# Patient Record
Sex: FEMALE | Race: WHITE | NOT HISPANIC OR LATINO | Employment: FULL TIME | URBAN - METROPOLITAN AREA
[De-identification: names, ages, dates, MRNs, and addresses within clinical notes are randomized per-mention and may not be internally consistent; named-entity substitution may affect disease eponyms.]

---

## 2017-02-09 ENCOUNTER — OFFICE VISIT (OUTPATIENT)
Dept: HEMATOLOGY/ONCOLOGY | Facility: CLINIC | Age: 52
End: 2017-02-09
Payer: COMMERCIAL

## 2017-02-09 ENCOUNTER — LAB VISIT (OUTPATIENT)
Dept: LAB | Facility: HOSPITAL | Age: 52
End: 2017-02-09
Attending: INTERNAL MEDICINE
Payer: COMMERCIAL

## 2017-02-09 VITALS
HEIGHT: 64 IN | BODY MASS INDEX: 25.67 KG/M2 | TEMPERATURE: 98 F | SYSTOLIC BLOOD PRESSURE: 120 MMHG | OXYGEN SATURATION: 100 % | HEART RATE: 91 BPM | WEIGHT: 150.38 LBS | DIASTOLIC BLOOD PRESSURE: 82 MMHG | RESPIRATION RATE: 18 BRPM

## 2017-02-09 DIAGNOSIS — Z85.3 HISTORY OF RIGHT BREAST CANCER: Primary | ICD-10-CM

## 2017-02-09 DIAGNOSIS — Z85.3 HISTORY OF RIGHT BREAST CANCER: ICD-10-CM

## 2017-02-09 LAB
ALBUMIN SERPL BCP-MCNC: 4.4 G/DL
ALP SERPL-CCNC: 73 U/L
ALT SERPL W/O P-5'-P-CCNC: 15 U/L
ANION GAP SERPL CALC-SCNC: 14 MMOL/L
AST SERPL-CCNC: 22 U/L
BASOPHILS # BLD AUTO: 0.03 K/UL
BASOPHILS NFR BLD: 0.7 %
BILIRUB SERPL-MCNC: 0.7 MG/DL
BUN SERPL-MCNC: 12 MG/DL
CALCIUM SERPL-MCNC: 10.3 MG/DL
CHLORIDE SERPL-SCNC: 103 MMOL/L
CHOLEST/HDLC SERPL: 3.1 {RATIO}
CO2 SERPL-SCNC: 24 MMOL/L
CREAT SERPL-MCNC: 0.8 MG/DL
DIFFERENTIAL METHOD: ABNORMAL
EOSINOPHIL # BLD AUTO: 0.2 K/UL
EOSINOPHIL NFR BLD: 3.8 %
ERYTHROCYTE [DISTWIDTH] IN BLOOD BY AUTOMATED COUNT: 12 %
EST. GFR  (AFRICAN AMERICAN): >60 ML/MIN/1.73 M^2
EST. GFR  (NON AFRICAN AMERICAN): >60 ML/MIN/1.73 M^2
GLUCOSE SERPL-MCNC: 91 MG/DL
HCT VFR BLD AUTO: 40.6 %
HDL/CHOLESTEROL RATIO: 31.8 %
HDLC SERPL-MCNC: 179 MG/DL
HDLC SERPL-MCNC: 57 MG/DL
HGB BLD-MCNC: 13.8 G/DL
LDLC SERPL CALC-MCNC: 110.4 MG/DL
LYMPHOCYTES # BLD AUTO: 2 K/UL
LYMPHOCYTES NFR BLD: 45.9 %
MCH RBC QN AUTO: 31.6 PG
MCHC RBC AUTO-ENTMCNC: 34 %
MCV RBC AUTO: 93 FL
MONOCYTES # BLD AUTO: 0.2 K/UL
MONOCYTES NFR BLD: 5.2 %
NEUTROPHILS # BLD AUTO: 2 K/UL
NEUTROPHILS NFR BLD: 44.4 %
NONHDLC SERPL-MCNC: 122 MG/DL
PLATELET # BLD AUTO: 196 K/UL
PMV BLD AUTO: 11.5 FL
POTASSIUM SERPL-SCNC: 3.7 MMOL/L
PROT SERPL-MCNC: 7.5 G/DL
RBC # BLD AUTO: 4.37 M/UL
SODIUM SERPL-SCNC: 141 MMOL/L
TRIGL SERPL-MCNC: 58 MG/DL
WBC # BLD AUTO: 4.44 K/UL

## 2017-02-09 PROCEDURE — 99999 PR PBB SHADOW E&M-EST. PATIENT-LVL III: CPT | Mod: PBBFAC,,, | Performed by: INTERNAL MEDICINE

## 2017-02-09 PROCEDURE — 80061 LIPID PANEL: CPT

## 2017-02-09 PROCEDURE — 36415 COLL VENOUS BLD VENIPUNCTURE: CPT | Mod: PO

## 2017-02-09 PROCEDURE — 85025 COMPLETE CBC W/AUTO DIFF WBC: CPT | Mod: PO

## 2017-02-09 PROCEDURE — 99214 OFFICE O/P EST MOD 30 MIN: CPT | Mod: S$GLB,,, | Performed by: INTERNAL MEDICINE

## 2017-02-09 PROCEDURE — 80053 COMPREHEN METABOLIC PANEL: CPT | Mod: PO

## 2017-02-09 RX ORDER — PHENTERMINE HYDROCHLORIDE 37.5 MG/1
37.5 TABLET ORAL DAILY
COMMUNITY
End: 2017-08-11
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
## 2017-02-10 ENCOUNTER — TELEPHONE (OUTPATIENT)
Dept: HEMATOLOGY/ONCOLOGY | Facility: CLINIC | Age: 52
End: 2017-02-10

## 2017-02-10 NOTE — TELEPHONE ENCOUNTER
----- Message from Ancelmo Batres MD sent at 2/10/2017 10:48 AM CST -----  Please let her know her lab tests including her cholesterol levels came out OK

## 2017-08-10 ENCOUNTER — TELEPHONE (OUTPATIENT)
Dept: HEMATOLOGY/ONCOLOGY | Facility: CLINIC | Age: 52
End: 2017-08-10

## 2017-08-10 DIAGNOSIS — Z85.3 HISTORY OF RIGHT BREAST CANCER: Primary | ICD-10-CM

## 2017-08-10 NOTE — TELEPHONE ENCOUNTER
----- Message from Zofia Vickers sent at 8/10/2017 10:37 AM CDT -----  Contact: [phone]   Pt would like consult with the nurse about appt.  Please call back at [phone].  Thx-[facility]

## 2017-08-11 ENCOUNTER — LAB VISIT (OUTPATIENT)
Dept: LAB | Facility: HOSPITAL | Age: 52
End: 2017-08-11
Attending: INTERNAL MEDICINE
Payer: COMMERCIAL

## 2017-08-11 ENCOUNTER — OFFICE VISIT (OUTPATIENT)
Dept: HEMATOLOGY/ONCOLOGY | Facility: CLINIC | Age: 52
End: 2017-08-11
Payer: COMMERCIAL

## 2017-08-11 VITALS
HEART RATE: 64 BPM | SYSTOLIC BLOOD PRESSURE: 123 MMHG | WEIGHT: 164.69 LBS | TEMPERATURE: 98 F | BODY MASS INDEX: 28.12 KG/M2 | RESPIRATION RATE: 16 BRPM | DIASTOLIC BLOOD PRESSURE: 85 MMHG | OXYGEN SATURATION: 99 % | HEIGHT: 64 IN

## 2017-08-11 DIAGNOSIS — Z85.3 HISTORY OF RIGHT BREAST CANCER: Primary | ICD-10-CM

## 2017-08-11 DIAGNOSIS — Z85.3 HISTORY OF RIGHT BREAST CANCER: ICD-10-CM

## 2017-08-11 LAB
ALBUMIN SERPL BCP-MCNC: 4 G/DL
ALP SERPL-CCNC: 74 U/L
ALT SERPL W/O P-5'-P-CCNC: 19 U/L
ANION GAP SERPL CALC-SCNC: 10 MMOL/L
AST SERPL-CCNC: 27 U/L
BASOPHILS # BLD AUTO: 0.02 K/UL
BASOPHILS NFR BLD: 0.6 %
BILIRUB SERPL-MCNC: 0.5 MG/DL
BUN SERPL-MCNC: 10 MG/DL
CALCIUM SERPL-MCNC: 9.7 MG/DL
CHLORIDE SERPL-SCNC: 105 MMOL/L
CO2 SERPL-SCNC: 27 MMOL/L
CREAT SERPL-MCNC: 0.8 MG/DL
DIFFERENTIAL METHOD: ABNORMAL
EOSINOPHIL # BLD AUTO: 0.3 K/UL
EOSINOPHIL NFR BLD: 7.2 %
ERYTHROCYTE [DISTWIDTH] IN BLOOD BY AUTOMATED COUNT: 12.4 %
EST. GFR  (AFRICAN AMERICAN): >60 ML/MIN/1.73 M^2
EST. GFR  (NON AFRICAN AMERICAN): >60 ML/MIN/1.73 M^2
GLUCOSE SERPL-MCNC: 89 MG/DL
HCT VFR BLD AUTO: 38.2 %
HGB BLD-MCNC: 13 G/DL
LYMPHOCYTES # BLD AUTO: 1.7 K/UL
LYMPHOCYTES NFR BLD: 47.9 %
MCH RBC QN AUTO: 31.6 PG
MCHC RBC AUTO-ENTMCNC: 34 G/DL
MCV RBC AUTO: 93 FL
MONOCYTES # BLD AUTO: 0.2 K/UL
MONOCYTES NFR BLD: 4.5 %
NEUTROPHILS # BLD AUTO: 1.4 K/UL
NEUTROPHILS NFR BLD: 39.8 %
PLATELET # BLD AUTO: 197 K/UL
PMV BLD AUTO: 11 FL
POTASSIUM SERPL-SCNC: 3.9 MMOL/L
PROT SERPL-MCNC: 7.1 G/DL
RBC # BLD AUTO: 4.11 M/UL
SODIUM SERPL-SCNC: 142 MMOL/L
WBC # BLD AUTO: 3.59 K/UL

## 2017-08-11 PROCEDURE — 99999 PR PBB SHADOW E&M-EST. PATIENT-LVL III: CPT | Mod: PBBFAC,,, | Performed by: INTERNAL MEDICINE

## 2017-08-11 PROCEDURE — 36415 COLL VENOUS BLD VENIPUNCTURE: CPT | Mod: PO

## 2017-08-11 PROCEDURE — 3008F BODY MASS INDEX DOCD: CPT | Mod: S$GLB,,, | Performed by: INTERNAL MEDICINE

## 2017-08-11 PROCEDURE — 85025 COMPLETE CBC W/AUTO DIFF WBC: CPT | Mod: PO

## 2017-08-11 PROCEDURE — 80053 COMPREHEN METABOLIC PANEL: CPT | Mod: PO

## 2017-08-11 PROCEDURE — 99214 OFFICE O/P EST MOD 30 MIN: CPT | Mod: S$GLB,,, | Performed by: INTERNAL MEDICINE

## 2017-08-14 ENCOUNTER — TELEPHONE (OUTPATIENT)
Dept: HEMATOLOGY/ONCOLOGY | Facility: CLINIC | Age: 52
End: 2017-08-14

## 2017-08-14 ENCOUNTER — PATIENT MESSAGE (OUTPATIENT)
Dept: HEMATOLOGY/ONCOLOGY | Facility: CLINIC | Age: 52
End: 2017-08-14

## 2017-08-14 DIAGNOSIS — Z85.3 HISTORY OF BREAST CANCER: Primary | ICD-10-CM

## 2017-08-14 NOTE — TELEPHONE ENCOUNTER
----- Message from Ancelmo Batres MD sent at 8/14/2017  3:36 PM CDT -----  Please add a lipid profile to her tests when she comes back in 6 months  DR BATRES

## 2017-10-12 ENCOUNTER — OFFICE VISIT (OUTPATIENT)
Dept: HEMATOLOGY/ONCOLOGY | Facility: CLINIC | Age: 52
End: 2017-10-12
Payer: COMMERCIAL

## 2017-10-12 VITALS
TEMPERATURE: 99 F | RESPIRATION RATE: 18 BRPM | WEIGHT: 158.94 LBS | DIASTOLIC BLOOD PRESSURE: 84 MMHG | BODY MASS INDEX: 27.13 KG/M2 | HEART RATE: 72 BPM | SYSTOLIC BLOOD PRESSURE: 120 MMHG | OXYGEN SATURATION: 96 % | HEIGHT: 64 IN

## 2017-10-12 DIAGNOSIS — Z85.3 HISTORY OF RIGHT BREAST CANCER: Primary | ICD-10-CM

## 2017-10-12 PROCEDURE — 99999 PR PBB SHADOW E&M-EST. PATIENT-LVL III: CPT | Mod: PBBFAC,,, | Performed by: INTERNAL MEDICINE

## 2017-10-12 PROCEDURE — 99213 OFFICE O/P EST LOW 20 MIN: CPT | Mod: S$GLB,,, | Performed by: INTERNAL MEDICINE

## 2017-10-12 NOTE — PROGRESS NOTES
Subjective:       Patient ID: Kaelyn Rosales is a 52 y.o. female.    Chief Complaint: Follow-up    HPI This is a 50-year-old  lady who comes for follow up pf her previously diagnosed rbeast cancer    She saw   me for a second opinion in 04/2009. The patient informed me that she had   been having nipple pain on the right breast for several months. Because of  the persistence of the pain, she was evaluated and found to have an   abnormality in the breast on repeat mammograms. One done four months prior  to that had been negative. She had a biopsy that showed invasive   carcinoma. She elected to have surgery in [place] and had decided to   have a bilateral mastectomy. She had a modified radical mastectomy with   right and a simple mastectomy on the left on 03/03/2009. The pathology   indicated that she had a tubular adenocarcinoma of the right breast with   negative lymph node. It measured 0.6 cm. It was ER/CO positive.   HER-2/Karla was 1+. This corresponds to sample [id number] from the   [facility] [place], [place], done on 03/03/2009.   The patient had a cholecystectomy in December of 2007. Some liver nodules   were seen on preop CT, but at the time of the surgery the nodules looked   benign and they were not biopsied. CT scans done in [place], [place], in   01/27/2009, where she had gone on opinion before deciding on surgery had   been reported as showing increase in the size of the liver nodules when   compared to a CT of 12/2007.     Imaging studies done at our institution were finally interpreted by the   radiologist as showing cysts in the liver as opposed to solid masses. She   was asked to consider taking tamoxifen in the adjuvant setting. She took   it for a few weeks but started feeling very poorly. She has been followed   off any medications.   She comes in ahead of her scheduled appointmetn because she felt what feels like a knot in the right axilla  SOCIAL HISTORY: She  is  with one child. She lives in [place].  She quit smoking around . She smoked 1 to 1-1/2 packs a day for  10 years. No history of significant drinking. 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     FAMILY HISTORY: No diabetes or heart attacks. Uncle had cancer of the   prostate. Father had colon cancer at 79.     PREVIOUS SURGERIES: Bilateral mastectomy followed by reconstruction.   Myomectomy of the uterus in place. Cholecystectomy,  and   laparoscopy.   Right thumb surgery    PAST MEDICAL HISTORY:   1. History of breast cancer in the right s/p mastectomy and s/p simple prophylactic mastectomy on the left.  2. Status post bilateral reconstruction of breast.   3. Arthralgias.   4. Hot flashes.   5- fatty liver  6-Pancreatitis  7-diverticulosis by colonoscopy  Review of Systems   Constitutional: Negative.  Negative for appetite change and unexpected weight change.   HENT: Negative.    Eyes: Negative.  Negative for visual disturbance.   Respiratory: Negative.  Negative for cough, shortness of breath and wheezing.    Cardiovascular: Negative.  Negative for chest pain.   Gastrointestinal: Negative.  Negative for abdominal pain and diarrhea.   Genitourinary: Negative.  Negative for frequency.   Musculoskeletal: Negative for back pain.   Skin: Negative for rash.   Neurological: Positive for headaches.   Hematological: Negative for adenopathy.   Psychiatric/Behavioral: Negative.  The patient is not nervous/anxious.        Objective:      Physical Exam   HENT:   Head: Normocephalic.   Nose: Nose normal.   Eyes: Conjunctivae are normal. Pupils are equal, round, and reactive to light.   Neck: Normal range of motion.   Cardiovascular: Normal rate.    Pulmonary/Chest: Effort normal.   Reconstructed right breast feels normal.  Careful palpation of the right axilla fails to show any obvious abnormalities   Musculoskeletal: Normal range of motion.   Neurological: She is alert.    Psychiatric: She has a normal mood and affect. Her behavior is normal. Judgment and thought content normal.       Wt Readings from Last 3 Encounters:   10/12/17 72.1 kg (158 lb 15.2 oz)   08/11/17 74.7 kg (164 lb 10.9 oz)   02/09/17 68.2 kg (150 lb 5.7 oz)     Temp Readings from Last 3 Encounters:   10/12/17 98.6 °F (37 °C) (Oral)   08/11/17 97.9 °F (36.6 °C) (Oral)   02/09/17 98.3 °F (36.8 °C) (Oral)     BP Readings from Last 3 Encounters:   10/12/17 120/84   08/11/17 123/85   02/09/17 120/82     Pulse Readings from Last 3 Encounters:   10/12/17 72   08/11/17 64   02/09/17 91       Assessment:       1. History of right breast cancer      2-question of palpable abnormality in right axillae    Plan:     Pateint informed that I was not able to feel any abnormalities in the right axilla. She was asked to keep an eye on this and let me know if the symptoms do not improve or worsen, otherwise keep future  appointments as previously scheduled.

## 2018-03-15 ENCOUNTER — TELEPHONE (OUTPATIENT)
Dept: HEMATOLOGY/ONCOLOGY | Facility: CLINIC | Age: 53
End: 2018-03-15

## 2018-03-15 DIAGNOSIS — Z85.3 HISTORY OF RIGHT BREAST CANCER: Primary | ICD-10-CM

## 2018-03-15 NOTE — TELEPHONE ENCOUNTER
----- Message from Samreen Murray sent at 3/15/2018  9:35 AM CDT -----  Contact: pt  Pt is requesting a call back from nurse in regards to her test scheduled for mar 16, 2018 pt stated that she would like to add thyroid and cholesterol to her blood work.        [phone] (home)

## 2018-04-20 ENCOUNTER — OFFICE VISIT (OUTPATIENT)
Dept: HEMATOLOGY/ONCOLOGY | Facility: CLINIC | Age: 53
End: 2018-04-20
Payer: COMMERCIAL

## 2018-04-20 ENCOUNTER — LAB VISIT (OUTPATIENT)
Dept: LAB | Facility: HOSPITAL | Age: 53
End: 2018-04-20
Attending: INTERNAL MEDICINE
Payer: COMMERCIAL

## 2018-04-20 VITALS
OXYGEN SATURATION: 98 % | HEART RATE: 60 BPM | RESPIRATION RATE: 18 BRPM | TEMPERATURE: 98 F | BODY MASS INDEX: 27.1 KG/M2 | SYSTOLIC BLOOD PRESSURE: 104 MMHG | HEIGHT: 64 IN | WEIGHT: 158.75 LBS | DIASTOLIC BLOOD PRESSURE: 80 MMHG

## 2018-04-20 DIAGNOSIS — Z85.3 HISTORY OF RIGHT BREAST CANCER: Primary | ICD-10-CM

## 2018-04-20 DIAGNOSIS — Z85.3 HISTORY OF RIGHT BREAST CANCER: ICD-10-CM

## 2018-04-20 LAB
ALBUMIN SERPL BCP-MCNC: 3.9 G/DL
ALP SERPL-CCNC: 84 U/L
ALT SERPL W/O P-5'-P-CCNC: 18 U/L
ANION GAP SERPL CALC-SCNC: 8 MMOL/L
AST SERPL-CCNC: 28 U/L
BASOPHILS # BLD AUTO: 0.02 K/UL
BASOPHILS NFR BLD: 0.5 %
BILIRUB SERPL-MCNC: 0.6 MG/DL
BUN SERPL-MCNC: 11 MG/DL
CALCIUM SERPL-MCNC: 9.5 MG/DL
CHLORIDE SERPL-SCNC: 104 MMOL/L
CHOLEST SERPL-MCNC: 151 MG/DL
CHOLEST/HDLC SERPL: 3.4 {RATIO}
CO2 SERPL-SCNC: 28 MMOL/L
CREAT SERPL-MCNC: 0.7 MG/DL
DIFFERENTIAL METHOD: ABNORMAL
EOSINOPHIL # BLD AUTO: 0.2 K/UL
EOSINOPHIL NFR BLD: 4.3 %
ERYTHROCYTE [DISTWIDTH] IN BLOOD BY AUTOMATED COUNT: 12.7 %
EST. GFR  (AFRICAN AMERICAN): >60 ML/MIN/1.73 M^2
EST. GFR  (NON AFRICAN AMERICAN): >60 ML/MIN/1.73 M^2
GLUCOSE SERPL-MCNC: 90 MG/DL
HCT VFR BLD AUTO: 38.2 %
HDLC SERPL-MCNC: 44 MG/DL
HDLC SERPL: 29.1 %
HGB BLD-MCNC: 12.8 G/DL
LDLC SERPL CALC-MCNC: 96.8 MG/DL
LYMPHOCYTES # BLD AUTO: 1.9 K/UL
LYMPHOCYTES NFR BLD: 44.9 %
MCH RBC QN AUTO: 31.3 PG
MCHC RBC AUTO-ENTMCNC: 33.5 G/DL
MCV RBC AUTO: 93 FL
MONOCYTES # BLD AUTO: 0.3 K/UL
MONOCYTES NFR BLD: 6.7 %
NEUTROPHILS # BLD AUTO: 1.8 K/UL
NEUTROPHILS NFR BLD: 43.6 %
NONHDLC SERPL-MCNC: 107 MG/DL
PLATELET # BLD AUTO: 166 K/UL
PMV BLD AUTO: 12.4 FL
POTASSIUM SERPL-SCNC: 3.9 MMOL/L
PROT SERPL-MCNC: 6.8 G/DL
RBC # BLD AUTO: 4.09 M/UL
SODIUM SERPL-SCNC: 140 MMOL/L
T4 SERPL-MCNC: 6.4 UG/DL
TRIGL SERPL-MCNC: 51 MG/DL
TSH SERPL DL<=0.005 MIU/L-ACNC: 1.35 UIU/ML
WBC # BLD AUTO: 4.21 K/UL

## 2018-04-20 PROCEDURE — 80061 LIPID PANEL: CPT | Mod: PO

## 2018-04-20 PROCEDURE — 80053 COMPREHEN METABOLIC PANEL: CPT | Mod: PO

## 2018-04-20 PROCEDURE — 36415 COLL VENOUS BLD VENIPUNCTURE: CPT | Mod: PO

## 2018-04-20 PROCEDURE — 84436 ASSAY OF TOTAL THYROXINE: CPT

## 2018-04-20 PROCEDURE — 99214 OFFICE O/P EST MOD 30 MIN: CPT | Mod: S$GLB,,, | Performed by: INTERNAL MEDICINE

## 2018-04-20 PROCEDURE — 84443 ASSAY THYROID STIM HORMONE: CPT

## 2018-04-20 PROCEDURE — 85025 COMPLETE CBC W/AUTO DIFF WBC: CPT | Mod: PO

## 2018-04-20 PROCEDURE — 99999 PR PBB SHADOW E&M-EST. PATIENT-LVL III: CPT | Mod: PBBFAC,,, | Performed by: INTERNAL MEDICINE

## 2018-04-20 NOTE — PROGRESS NOTES
Subjective:       Patient ID: Kaelyn Rosales is a 52 y.o. female.    Chief Complaint: Follow-up    HPI  This is a 52 year-old  lady who comes for follow up pf her previously diagnosed rbeast cancer    She saw   me for a second opinion in 04/2009. The patient informed me that she had   been having nipple pain on the right breast for several months. Because of  the persistence of the pain, she was evaluated and found to have an   abnormality in the breast on repeat mammograms. One done four months prior  to that had been negative. She had a biopsy that showed invasive   carcinoma. She elected to have surgery in [place] and had decided to   have a bilateral mastectomy. She had a modified radical mastectomy with   right and a simple mastectomy on the left on 03/03/2009. The pathology   indicated that she had a tubular adenocarcinoma of the right breast with   negative lymph node. It measured 0.6 cm. It was ER/DE positive.   HER-2/Karla was 1+. This corresponds to sample [id number] from the   [facility] [place], [place], done on 03/03/2009.   The patient had a cholecystectomy in December of 2007. Some liver nodules   were seen on preop CT, but at the time of the surgery the nodules looked   benign and they were not biopsied. CT scans done in [place], [place], in   01/27/2009, where she had gone on opinion before deciding on surgery had   been reported as showing increase in the size of the liver nodules when   compared to a CT of 12/2007.     Imaging studies done at our institution were finally interpreted by the   radiologist as showing cysts in the liver as opposed to solid masses. She   was asked to consider taking tamoxifen in the adjuvant setting. She took   it for a few weeks but started feeling very poorly. She has been followed   off any medications.   She says shehas recurrent bouts of pancreatitis which is being addressed by hr gastroenterologist..  She says that is believed  that the pancreatitis are due to alcohol which she would take while  on vacation and weekeends. Says she will not drink any more.    She does not have any more mammograms since she had both breast removed with subsequent implants  SOCIAL HISTORY: She is  with one child. She lives in [place].  She quit smoking around . She smoked 1 to 1-1/2 packs a day for  10 years.   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     FAMILY HISTORY: No diabetes or heart attacks. Uncle had cancer of the   prostate. Father had colon cancer at 79.     PREVIOUS SURGERIES: Bilateral mastectomy followed by reconstruction.   Myomectomy of the uterus in place. Cholecystectomy,  and   laparoscopy.   Right thumb surgery    PAST MEDICAL HISTORY:   1. History of breast cancer in the right s/p mastectomy and s/p simple prophylactic mastectomy on the left.  2. Status post bilateral reconstruction of breast.   3. Arthralgias.   4. Hot flashes.   5- fatty liver  6-Pancreatitis  7-diverticulosis by colonoscopy  Review of Systems   Constitutional: Negative.    HENT: Negative.    Eyes: Negative.    Respiratory: Negative.  Negative for cough and wheezing.    Cardiovascular: Negative.  Negative for chest pain.   Gastrointestinal: Negative.    Genitourinary: Negative.    Neurological: Negative.    Psychiatric/Behavioral: Negative.        Objective:      Physical Exam   Constitutional: She is oriented to person, place, and time. She appears well-developed. No distress.   HENT:   Head: Normocephalic.   Right Ear: Tympanic membrane, external ear and ear canal normal.   Left Ear: Tympanic membrane, external ear and ear canal normal.   Nose: Nose normal. Right sinus exhibits no maxillary sinus tenderness and no frontal sinus tenderness. Left sinus exhibits no maxillary sinus tenderness and no frontal sinus tenderness.   Mouth/Throat: Oropharynx is clear and moist and mucous membranes are normal.   Teeth normal.  Gums  normal.   Eyes: Conjunctivae and lids are normal. Pupils are equal, round, and reactive to light.   Neck: Normal carotid pulses, no hepatojugular reflux and no JVD present. Carotid bruit is not present. No tracheal deviation present. No thyroid mass and no thyromegaly present.   Cardiovascular: Normal rate, regular rhythm, S1 normal, S2 normal, normal heart sounds and intact distal pulses.  Exam reveals no gallop and no friction rub.    No murmur heard.  Carotid exam normal   Pulmonary/Chest: Effort normal and breath sounds normal. No accessory muscle usage. No respiratory distress. She has no wheezes. She has no rales. She exhibits no tenderness.   Reconstructed breasts fell normal without mases   Abdominal: Soft. Normal appearance. She exhibits no distension and no mass. There is no splenomegaly or hepatomegaly. There is no tenderness. There is no rebound and no guarding.   Musculoskeletal: Normal range of motion. She exhibits no edema or tenderness.        Right hand: Normal.        Left hand: Normal.       Lymphadenopathy:     She has no cervical adenopathy.     She has no axillary adenopathy.        Right: No inguinal and no supraclavicular adenopathy present.        Left: No inguinal and no supraclavicular adenopathy present.   Neurological: She is alert and oriented to person, place, and time. She has normal strength. No cranial nerve deficit. Coordination normal.   Skin: Skin is warm and dry. No rash noted. She is not diaphoretic. No cyanosis or erythema. No pallor. Nails show no clubbing.   Psychiatric: She has a normal mood and affect. Her behavior is normal. Judgment and thought content normal.       Wt Readings from Last 3 Encounters:   04/20/18 72 kg (158 lb 11.7 oz)   10/12/17 72.1 kg (158 lb 15.2 oz)   08/11/17 74.7 kg (164 lb 10.9 oz)     Temp Readings from Last 3 Encounters:   04/20/18 98 °F (36.7 °C) (Oral)   10/12/17 98.6 °F (37 °C) (Oral)   08/11/17 97.9 °F (36.6 °C) (Oral)     BP Readings from  Last 3 Encounters:   04/20/18 104/80   10/12/17 120/84   08/11/17 123/85     Pulse Readings from Last 3 Encounters:   04/20/18 60   10/12/17 72   08/11/17 64       Assessment:       1. History of right breast cancer    2. Recurrent pancreatitis        Plan:       CBC and cmp are pending  She will be asked to see me in a year with a cbc and a cmp.  Follow up with GI outside the clinic regarding her recurrent bouts of pancreatitis

## 2018-05-21 ENCOUNTER — OFFICE VISIT (OUTPATIENT)
Dept: CARDIOLOGY | Facility: CLINIC | Age: 53
End: 2018-05-21
Payer: COMMERCIAL

## 2018-05-21 ENCOUNTER — HOSPITAL ENCOUNTER (OUTPATIENT)
Dept: RADIOLOGY | Facility: HOSPITAL | Age: 53
Discharge: HOME | End: 2018-05-21
Attending: INTERNAL MEDICINE
Payer: COMMERCIAL

## 2018-05-21 ENCOUNTER — OFFICE VISIT (OUTPATIENT)
Dept: HEMATOLOGY/ONCOLOGY | Facility: CLINIC | Age: 53
End: 2018-05-21
Payer: COMMERCIAL

## 2018-05-21 ENCOUNTER — TELEPHONE (OUTPATIENT)
Dept: HEMATOLOGY/ONCOLOGY | Facility: CLINIC | Age: 53
End: 2018-05-21

## 2018-05-21 VITALS
BODY MASS INDEX: 26.18 KG/M2 | HEIGHT: 64 IN | DIASTOLIC BLOOD PRESSURE: 98 MMHG | TEMPERATURE: 98 F | WEIGHT: 153.38 LBS | SYSTOLIC BLOOD PRESSURE: 130 MMHG | OXYGEN SATURATION: 100 % | HEART RATE: 65 BPM

## 2018-05-21 VITALS
BODY MASS INDEX: 25.95 KG/M2 | DIASTOLIC BLOOD PRESSURE: 70 MMHG | SYSTOLIC BLOOD PRESSURE: 110 MMHG | HEIGHT: 64 IN | WEIGHT: 152 LBS | HEART RATE: 80 BPM

## 2018-05-21 DIAGNOSIS — Z85.3 HISTORY OF RIGHT BREAST CANCER: ICD-10-CM

## 2018-05-21 DIAGNOSIS — R06.02 SHORTNESS OF BREATH: ICD-10-CM

## 2018-05-21 DIAGNOSIS — Z85.3 HISTORY OF RIGHT BREAST CANCER: Primary | ICD-10-CM

## 2018-05-21 DIAGNOSIS — R94.31 ABNORMAL EKG: ICD-10-CM

## 2018-05-21 DIAGNOSIS — R93.89 ABNORMAL CXR: ICD-10-CM

## 2018-05-21 DIAGNOSIS — R07.89 ATYPICAL CHEST PAIN: ICD-10-CM

## 2018-05-21 DIAGNOSIS — R94.31 ABNORMAL EKG: Primary | ICD-10-CM

## 2018-05-21 PROCEDURE — 71046 X-RAY EXAM CHEST 2 VIEWS: CPT | Mod: TC,FY,PO

## 2018-05-21 PROCEDURE — 99999 PR PBB SHADOW E&M-EST. PATIENT-LVL III: CPT | Mod: PBBFAC,,, | Performed by: INTERNAL MEDICINE

## 2018-05-21 PROCEDURE — 3008F BODY MASS INDEX DOCD: CPT | Mod: CPTII,S$GLB,, | Performed by: INTERNAL MEDICINE

## 2018-05-21 PROCEDURE — 71046 X-RAY EXAM CHEST 2 VIEWS: CPT | Mod: 26,,, | Performed by: RADIOLOGY

## 2018-05-21 PROCEDURE — 99214 OFFICE O/P EST MOD 30 MIN: CPT | Mod: S$GLB,,, | Performed by: INTERNAL MEDICINE

## 2018-05-21 PROCEDURE — 99204 OFFICE O/P NEW MOD 45 MIN: CPT | Mod: S$GLB,,, | Performed by: INTERNAL MEDICINE

## 2018-05-21 RX ORDER — LORAZEPAM 0.5 MG/1
0.5 TABLET ORAL EVERY 6 HOURS PRN
COMMUNITY
End: 2021-01-15 | Stop reason: SDUPTHER

## 2018-05-21 RX ORDER — PREDNISONE 50 MG/1
TABLET ORAL
Qty: 3 TABLET | Refills: 0 | Status: SHIPPED | OUTPATIENT
Start: 2018-05-21 | End: 2018-05-22

## 2018-05-21 NOTE — TELEPHONE ENCOUNTER
Patient states that she went to urgent care at [facility] yesterday.  They told her that she had something a mass in her lung that is causing her shortness of breath.  I advised her to come in now.

## 2018-05-21 NOTE — PROGRESS NOTES
Subjective:    Patient ID:  Kaelyn Rosales is a 52 y.o. female who presents for evaluation of Establish Care; Follow-up; Chest Pain; and Shortness of Breath    Pt referred by Dr. Tj Laguerre.      HPI pt presents for cardiac evaluation.  Pt has h/o breast cancer and h/o multiple hospitalizations for pancreatitis.  Former smoker.    No etoh last few months.   Pt seen in [facility] walk in clinic yesterday for dyspnea.  ecg yesterday showed NSR, intraventricular conduction delay, low voltage, nonspecific t wave abnl.   Under a lot of stress, anxiety with her work situation.  Has a bad boss per pt who antagonizes her.   She states she has chronic panic attacks.  Seen in ER for 2 week sxs of HAYDEN, lightheadedness, dizziness, headaches.  Some chest pressure, mid chest, not constant, over last few weeks.  CP atypical, lasts variable duration.   + fatigue.    No specific aggravating or alleviating factors.   States she hasn't had panic attacks in years.    Also had CXR yesterday, some sort of small right upper lobe density noted.  She had repeat CXR today at [facility] and was -.  Heme/onc ordered CT chest tomorrow.   She denies prior h/o CAD, CHF.       Current Outpatient Prescriptions:     LORazepam (ATIVAN) 0.5 MG tablet, Take 0.5 mg by mouth every 6 (six) hours as needed for Anxiety., Disp: , Rfl:     MULTIVITAMIN (MULTIPLE VITAMIN ORAL), Take by mouth once daily., Disp: , Rfl:     predniSONE (DELTASONE) 50 MG Tab, One tablet 13 hours before procedure, one tablet 7 hours before procedure and one tablet one hour before procedure, Disp: 3 tablet, Rfl: 0      Review of Systems   Constitution: Positive for malaise/fatigue.   HENT: Negative.    Eyes: Negative.    Cardiovascular: Positive for chest pain and dyspnea on exertion.   Respiratory: Positive for shortness of breath.    Endocrine: Negative.    Hematologic/Lymphatic: Negative.    Skin: Negative.    Musculoskeletal: Positive for arthritis.   Gastrointestinal:  "Negative.    Genitourinary: Negative.    Neurological: Positive for dizziness, headaches and light-headedness.   Psychiatric/Behavioral: Negative.    Allergic/Immunologic: Negative.        /70 (BP Location: Left arm, Patient Position: Sitting, BP Method: Large (Manual))   Pulse 80   Ht 5' 4" (1.626 m)   Wt 68.9 kg (152 lb)   BMI 26.09 kg/m²     Wt Readings from Last 3 Encounters:   05/21/18 68.9 kg (152 lb)   04/20/18 72 kg (158 lb 11.7 oz)   10/12/17 72.1 kg (158 lb 15.2 oz)     Temp Readings from Last 3 Encounters:   04/20/18 98 °F (36.7 °C) (Oral)   10/12/17 98.6 °F (37 °C) (Oral)   08/11/17 97.9 °F (36.6 °C) (Oral)     BP Readings from Last 3 Encounters:   05/21/18 110/70   04/20/18 104/80   10/12/17 120/84     Pulse Readings from Last 3 Encounters:   05/21/18 80   04/20/18 60   10/12/17 72          Objective:    Physical Exam   Constitutional: She is oriented to person, place, and time. Vital signs are normal. She appears well-developed and well-nourished. She is active and cooperative. She does not have a sickly appearance. She does not appear ill. No distress.   HENT:   Head: Normocephalic.   Neck: Neck supple. Normal carotid pulses, no hepatojugular reflux and no JVD present. Carotid bruit is not present. No thyromegaly present.   Cardiovascular: Normal rate, regular rhythm, S1 normal, S2 normal, normal heart sounds and normal pulses.  PMI is not displaced.  Exam reveals no gallop and no friction rub.    No murmur heard.  Pulses:       Radial pulses are 2+ on the right side, and 2+ on the left side.   Pulmonary/Chest: Effort normal and breath sounds normal. She has no wheezes. She has no rales.   Abdominal: Soft. Normal appearance, normal aorta and bowel sounds are normal. She exhibits no pulsatile liver, no abdominal bruit, no ascites and no mass. There is no splenomegaly or hepatomegaly. There is no tenderness.   Musculoskeletal: She exhibits no edema.   Lymphadenopathy:     She has no cervical " adenopathy.   Neurological: She is alert and oriented to person, place, and time.   Skin: Skin is warm. She is not diaphoretic.   Psychiatric: She has a normal mood and affect. Her behavior is normal.   Nursing note and vitals reviewed.      I have reviewed all pertinent labs and cardiac studies.      Chemistry        Component Value Date/Time     04/20/2018 1024    K 3.9 04/20/2018 1024     04/20/2018 1024    CO2 28 04/20/2018 1024    BUN 11 04/20/2018 1024    CREATININE 0.7 04/20/2018 1024    GLU 90 04/20/2018 1024        Component Value Date/Time    CALCIUM 9.5 04/20/2018 1024    ALKPHOS 84 04/20/2018 1024    AST 28 04/20/2018 1024    ALT 18 04/20/2018 1024    BILITOT 0.6 04/20/2018 1024    ESTGFRAFRICA >60 04/20/2018 1024    EGFRNONAA >60 04/20/2018 1024        Lab Results   Component Value Date    WBC 4.21 04/20/2018    HGB 12.8 04/20/2018    HCT 38.2 04/20/2018    MCV 93 04/20/2018     04/20/2018     No results found for: LABA1C, HGBA1C  Lab Results   Component Value Date    CHOL 151 04/20/2018    CHOL 179 02/09/2017    CHOL 164 01/11/2012     Lab Results   Component Value Date    HDL 44 04/20/2018    HDL 57 02/09/2017    HDL 67 01/11/2012     Lab Results   Component Value Date    LDLCALC 96.8 04/20/2018    LDLCALC 110.4 02/09/2017    LDLCALC 87.0 01/11/2012     Lab Results   Component Value Date    TRIG 51 04/20/2018    TRIG 58 02/09/2017    TRIG 50 01/11/2012     Lab Results   Component Value Date    CHOLHDL 29.1 04/20/2018    CHOLHDL 31.8 02/09/2017    CHOLHDL 40.9 01/11/2012           Assessment:       1. Abnormal EKG    2. Shortness of breath    3. Atypical chest pain         Plan:             Atypical cp/HAYDEN sxs last 2 weeks.  sxs may be due to stress/anxiety issues.  F/u with heme/onc on abnl CXR and CT chest tomorrow.  Recommend stress MPI + echocardiogram for further evaluation.  10 year ASCVD risk 1.2%  Phone review for test results.

## 2018-05-21 NOTE — TELEPHONE ENCOUNTER
----- Message from Saravanan Quan sent at 5/21/2018  7:14 AM CDT -----  Contact: Pt   States she's calling rg having some test results so that Dr's office will know what's going on and what to schedule for her and evaluate if pt needs to be hospitalized as well and can be reached at [phone]//rosalia/dbw

## 2018-05-21 NOTE — PROGRESS NOTES
Subjective:       Patient ID: Kaelyn Rosales is a 52 y.o. female.    Chief Complaint: Chest Pain    HPI This is a 52 year-old  lady who comes for follow up pf her previously diagnosed rbeast cancer    She saw   me for a second opinion in 04/2009. The patient informed me that she had   been having nipple pain on the right breast for several months. Because of  the persistence of the pain, she was evaluated and found to have an   abnormality in the breast on repeat mammograms. One done four months prior  to that had been negative. She had a biopsy that showed invasive   carcinoma. She elected to have surgery in [place] and had decided to   have a bilateral mastectomy. She had a modified radical mastectomy with   right and a simple mastectomy on the left on 03/03/2009. The pathology   indicated that she had a tubular adenocarcinoma of the right breast with   negative lymph node. It measured 0.6 cm. It was ER/MD positive.   HER-2/Karla was 1+. This corresponds to sample [id number] from the   [facility] [place], [place], done on 03/03/2009.   The patient had a cholecystectomy in December of 2007. Some liver nodules   were seen on preop CT, but at the time of the surgery the nodules looked   benign and they were not biopsied. CT scans done in [place], [place], in   01/27/2009, where she had gone on opinion before deciding on surgery had   been reported as showing increase in the size of the liver nodules when   compared to a CT of 12/2007.     Imaging studies done at our institution were finally interpreted by the   radiologist as showing cysts in the liver as opposed to solid masses. She   was asked to consider taking tamoxifen in the adjuvant setting. She took   it for a few weeks but started feeling very poorly. She has been followed   off any medications.   She says shehas recurrent bouts of pancreatitis which is being addressed by hr gastroenterologist..  She says that is believed  that the pancreatitis are due to alcohol which she would take while  on vacation and weekeends. Says she will not drink any more.     She does not have any more mammograms since she had both breast removed with subsequent implants    She comes tgday ahead of her scheduled appointment because for the last several days she has felt SOB along with a pressure sensation in the chest.  She went to an Urgent Care clinic last night. Her EKG was abnormal, showing some ST-T inversion in  the analy lateral leads.  No previous EKGs were there for comparison. In addition, a CXR was read as showing a probable stellate lesion in the right upper lobe, below the first rib  SOCIAL HISTORY: She is  with one child. She lives in [place].    She quit smoking around . She smoked 1 to 1-1/2 packs a day for  10 years.   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     FAMILY HISTORY: No diabetes or heart attacks. Uncle had cancer of the   prostate. Father had colon cancer at 79.     PREVIOUS SURGERIES: Bilateral mastectomy followed by reconstruction.   Myomectomy of the uterus in place. Cholecystectomy,  and   laparoscopy.   Right thumb surgery    PAST MEDICAL HISTORY:   1. History of breast cancer in the right s/p mastectomy and s/p simple prophylactic mastectomy on the left.  2. Status post bilateral reconstruction of breast.   3. Arthralgias.   4. Hot flashes.   5- fatty liver  6-Pancreatitis  7-diverticulosis by colonoscopy  Review of Systems   Constitutional: Negative.    HENT: Negative.    Eyes: Negative.    Respiratory: Positive for shortness of breath. Negative for cough and wheezing.    Cardiovascular:        Chest pressure   Gastrointestinal: Negative.    Genitourinary: Negative.    Neurological: Negative.    Psychiatric/Behavioral: Negative.        Objective:      Physical Exam   Constitutional: She is oriented to person, place, and time. She appears well-developed. No distress.    HENT:   Head: Normocephalic.   Right Ear: Tympanic membrane, external ear and ear canal normal.   Left Ear: Tympanic membrane, external ear and ear canal normal.   Nose: Nose normal. Right sinus exhibits no maxillary sinus tenderness and no frontal sinus tenderness. Left sinus exhibits no maxillary sinus tenderness and no frontal sinus tenderness.   Mouth/Throat: Oropharynx is clear and moist and mucous membranes are normal.   Teeth normal.  Gums normal.   Eyes: Conjunctivae and lids are normal. Pupils are equal, round, and reactive to light.   Neck: Normal carotid pulses, no hepatojugular reflux and no JVD present. Carotid bruit is not present. No tracheal deviation present. No thyroid mass and no thyromegaly present.   Cardiovascular: Normal rate, regular rhythm, S1 normal, S2 normal, normal heart sounds and intact distal pulses.  Exam reveals no gallop and no friction rub.    No murmur heard.  Carotid exam normal   Pulmonary/Chest: Effort normal and breath sounds normal. No accessory muscle usage. No respiratory distress. She has no wheezes. She has no rales. She exhibits no tenderness.   Abdominal: Soft. Normal appearance. She exhibits no distension and no mass. There is no splenomegaly or hepatomegaly. There is no tenderness. There is no rebound and no guarding.   Musculoskeletal: Normal range of motion. She exhibits no edema or tenderness.        Right hand: Normal.        Left hand: Normal.       Lymphadenopathy:     She has no cervical adenopathy.     She has no axillary adenopathy.        Right: No inguinal and no supraclavicular adenopathy present.        Left: No inguinal and no supraclavicular adenopathy present.   Neurological: She is alert and oriented to person, place, and time. She has normal strength. No cranial nerve deficit. Coordination normal.   Skin: Skin is warm and dry. No rash noted. She is not diaphoretic. No cyanosis or erythema. No pallor. Nails show no clubbing.   Psychiatric: She has  a normal mood and affect. Her behavior is normal. Judgment and thought content normal.       Wt Readings from Last 3 Encounters:   05/21/18 69.6 kg (153 lb 6.4 oz)   04/20/18 72 kg (158 lb 11.7 oz)   10/12/17 72.1 kg (158 lb 15.2 oz)     Temp Readings from Last 3 Encounters:   05/21/18 98.1 °F (36.7 °C) (Oral)   04/20/18 98 °F (36.7 °C) (Oral)   10/12/17 98.6 °F (37 °C) (Oral)     BP Readings from Last 3 Encounters:   05/21/18 (!) 130/98   04/20/18 104/80   10/12/17 120/84     Pulse Readings from Last 3 Encounters:   05/21/18 65   04/20/18 60   10/12/17 72       Assessment:       1. History of right breast cancer    2. Abnormal CXR    3. Abnormal EKG      4-SOB  Plan:        a new CXR was read and was personally reviewed and discussed with radiology. The interpretation at our Clinic was that the X ray was normal.  Because of there discrepancy , we will request a CT of the chest.  In addition, the EKG shows some T  wave inversion   in the analy-lateral leads so she will meet with Cardiology today.  See me after the CT of the chest. premedications ordered because of presumed Iodine allergy. Benadryl/prednisone prescribeed

## 2018-05-22 ENCOUNTER — HOSPITAL ENCOUNTER (OUTPATIENT)
Dept: RADIOLOGY | Facility: HOSPITAL | Age: 53
Discharge: HOME | End: 2018-05-22
Attending: INTERNAL MEDICINE
Payer: COMMERCIAL

## 2018-05-22 ENCOUNTER — OFFICE VISIT (OUTPATIENT)
Dept: HEMATOLOGY/ONCOLOGY | Facility: CLINIC | Age: 53
End: 2018-05-22
Payer: COMMERCIAL

## 2018-05-22 VITALS
HEART RATE: 88 BPM | BODY MASS INDEX: 26.15 KG/M2 | TEMPERATURE: 98 F | OXYGEN SATURATION: 98 % | DIASTOLIC BLOOD PRESSURE: 72 MMHG | SYSTOLIC BLOOD PRESSURE: 102 MMHG | WEIGHT: 153.19 LBS | HEIGHT: 64 IN

## 2018-05-22 DIAGNOSIS — R06.02 SHORTNESS OF BREATH: ICD-10-CM

## 2018-05-22 DIAGNOSIS — K76.89 HEPATIC CYST: ICD-10-CM

## 2018-05-22 DIAGNOSIS — Z85.3 HISTORY OF RIGHT BREAST CANCER: Primary | ICD-10-CM

## 2018-05-22 PROCEDURE — 25500020 PHARM REV CODE 255: Mod: PO | Performed by: INTERNAL MEDICINE

## 2018-05-22 PROCEDURE — 99999 PR PBB SHADOW E&M-EST. PATIENT-LVL III: CPT | Mod: PBBFAC,,, | Performed by: INTERNAL MEDICINE

## 2018-05-22 PROCEDURE — 99213 OFFICE O/P EST LOW 20 MIN: CPT | Mod: S$GLB,,, | Performed by: INTERNAL MEDICINE

## 2018-05-22 PROCEDURE — 3008F BODY MASS INDEX DOCD: CPT | Mod: CPTII,S$GLB,, | Performed by: INTERNAL MEDICINE

## 2018-05-22 PROCEDURE — 71260 CT THORAX DX C+: CPT | Mod: TC,PO

## 2018-05-22 PROCEDURE — 71260 CT THORAX DX C+: CPT | Mod: 26,,, | Performed by: RADIOLOGY

## 2018-05-22 RX ORDER — EPINEPHRINE 0.3 MG/.3ML
INJECTION SUBCUTANEOUS
COMMUNITY

## 2018-05-22 RX ORDER — DIAZEPAM 2 MG/1
2 TABLET ORAL EVERY 6 HOURS PRN
COMMUNITY
End: 2023-05-26

## 2018-05-22 RX ADMIN — IOHEXOL 75 ML: 350 INJECTION, SOLUTION INTRAVENOUS at 10:05

## 2018-05-22 NOTE — PROGRESS NOTES
Subjective:       Patient ID: Kaelyn Rosales is a 52 y.o. female.    Chief Complaint: Follow-up    HPI This is a 52 year-old  lady who comes for follow up pf her previously diagnosed rbeast cancer    She saw   me for a second opinion in 04/2009. The patient informed me that she had   been having nipple pain on the right breast for several months. Because of  the persistence of the pain, she was evaluated and found to have an   abnormality in the breast on repeat mammograms. One done four months prior  to that had been negative. She had a biopsy that showed invasive   carcinoma. She elected to have surgery in [place] and had decided to   have a bilateral mastectomy. She had a modified radical mastectomy with   right and a simple mastectomy on the left on 03/03/2009. The pathology   indicated that she had a tubular adenocarcinoma of the right breast with   negative lymph node. It measured 0.6 cm. It was ER/VA positive.   HER-2/Karla was 1+. This corresponds to sample [id number] from the   [facility] [place], [place], done on 03/03/2009.   The patient had a cholecystectomy in December of 2007. Some liver nodules   were seen on preop CT, but at the time of the surgery the nodules looked   benign and they were not biopsied. CT scans done in [place], [place], in   01/27/2009, where she had gone on opinion before deciding on surgery had   been reported as showing increase in the size of the liver nodules when   compared to a CT of 12/2007.     Imaging studies done at our institution were finally interpreted by the   radiologist as showing cysts in the liver as opposed to solid masses. She   was asked to consider taking tamoxifen in the adjuvant setting. She took   it for a few weeks but started feeling very poorly. She has been followed   off any medications.   She says shehas recurrent bouts of pancreatitis which is being addressed by hr gastroenterologist..  She says that is believed  that the pancreatitis are due to alcohol which she would take while  on vacation and weekeends. Says she will not drink any more.     She does not have any more mammograms since she had both breast removed with subsequent implants     She comes tgday ahead of her scheduled appointment because for the last several days she has felt SOB along with a pressure sensation in the chest.  She went to an Urgent Care clinic 2  Nights ago . Her EKG was abnormal,and she was asked to follow up with cardiology> she was seen yesterday by cardiology. For the time being a conservative approach was suggested.   In addition, a CXR was read as showing a probable stellate lesion in the right upper lobe, below the first rib.  A CXR here was read as negative. I ordered a CT of the chest  No nodules were reported. She ahs her known hepatic cysts some of which are worse, along with pulmonary blebs  She comes to discus what to do going forward  SOCIAL HISTORY: She is  with one child. She lives in [place].    She quit smoking around . She smoked 1 to 1-1/2 packs a day for  10 years.   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     FAMILY HISTORY: No diabetes or heart attacks. Uncle had cancer of the   prostate. Father had colon cancer at 79.     PREVIOUS SURGERIES: Bilateral mastectomy followed by reconstruction.   Myomectomy of the uterus in place. Cholecystectomy,  and   laparoscopy.   Right thumb surgery    PAST MEDICAL HISTORY:   1. History of breast cancer in the right s/p mastectomy and s/p simple prophylactic mastectomy on the left.  2. Status post bilateral reconstruction of breast.   3. Arthralgias.   4. Hepatic cysts   5- fatty liver  6-Recurrent ancreatitis  7-diverticulosis by colonoscopy  Review of Systems   Constitutional: Negative.    HENT: Negative.    Eyes: Negative.    Respiratory: Negative.  Negative for cough and wheezing.    Cardiovascular: Negative.  Negative for chest pain.    Gastrointestinal: Negative.    Genitourinary: Negative.    Neurological: Negative.    Psychiatric/Behavioral: Negative.        Objective:      Physical Exam   Constitutional: She is oriented to person, place, and time. She appears well-developed. No distress.   HENT:   Head: Normocephalic.   Right Ear: Tympanic membrane and ear canal normal.   Left Ear: Tympanic membrane and ear canal normal.   Nose: Right sinus exhibits no maxillary sinus tenderness and no frontal sinus tenderness. Left sinus exhibits no maxillary sinus tenderness and no frontal sinus tenderness.   Mouth/Throat: Mucous membranes are normal.   Teeth normal.  Gums normal.   Eyes: Lids are normal. Pupils are equal, round, and reactive to light.   Neck: Normal carotid pulses, no hepatojugular reflux and no JVD present. Carotid bruit is not present. No tracheal deviation present. No thyroid mass and no thyromegaly present.   Cardiovascular: Normal rate, S1 normal and S2 normal.  Exam reveals no gallop and no friction rub.    No murmur heard.  Carotid exam normal   Pulmonary/Chest: Effort normal. No accessory muscle usage. No respiratory distress. She has no wheezes. She has no rales. She exhibits no tenderness.   Abdominal: Normal appearance. She exhibits no distension and no mass. There is no splenomegaly or hepatomegaly. There is no tenderness. There is no rebound and no guarding.   Musculoskeletal: Normal range of motion. She exhibits no edema or tenderness.        Right hand: Normal.        Left hand: Normal.       Lymphadenopathy:     She has no cervical adenopathy.     She has no axillary adenopathy.        Right: No inguinal and no supraclavicular adenopathy present.        Left: No inguinal and no supraclavicular adenopathy present.   Neurological: She is alert and oriented to person, place, and time. She has normal strength. No cranial nerve deficit. Coordination normal.   Skin: Skin is warm and dry. No rash noted. She is not diaphoretic. No  cyanosis or erythema. No pallor. Nails show no clubbing.   Psychiatric: She has a normal mood and affect. Her behavior is normal. Judgment and thought content normal.       Wt Readings from Last 3 Encounters:   05/22/18 69.5 kg (153 lb 3.2 oz)   05/21/18 69.6 kg (153 lb 6.4 oz)   05/21/18 68.9 kg (152 lb)     Temp Readings from Last 3 Encounters:   05/22/18 98.1 °F (36.7 °C) (Oral)   05/21/18 98.1 °F (36.7 °C) (Oral)   04/20/18 98 °F (36.7 °C) (Oral)     BP Readings from Last 3 Encounters:   05/22/18 102/72   05/21/18 (!) 130/98   05/21/18 110/70     Pulse Readings from Last 3 Encounters:   05/22/18 88   05/21/18 65   05/21/18 80       Assessment:       1. History of right breast cancer    2. Hepatic cyst    3. Shortness of breath        Plan:       Patient came in accompanied by her . They were given a copy of the report. She was told about the pulmonary blebs. Since she complained of SOB, we will ask her to follow up with Pulmonary.  Keep appointments with em as scheduled previously

## 2018-05-28 ENCOUNTER — CLINICAL SUPPORT (OUTPATIENT)
Dept: CARDIOLOGY | Facility: CLINIC | Age: 53
End: 2018-05-28
Attending: INTERNAL MEDICINE
Payer: COMMERCIAL

## 2018-05-28 ENCOUNTER — HOSPITAL ENCOUNTER (OUTPATIENT)
Dept: RADIOLOGY | Facility: HOSPITAL | Age: 53
Discharge: HOME | End: 2018-05-28
Attending: INTERNAL MEDICINE
Payer: COMMERCIAL

## 2018-05-28 ENCOUNTER — TELEPHONE (OUTPATIENT)
Dept: CARDIOLOGY | Facility: CLINIC | Age: 53
End: 2018-05-28

## 2018-05-28 DIAGNOSIS — R94.31 ABNORMAL EKG: ICD-10-CM

## 2018-05-28 DIAGNOSIS — R07.89 ATYPICAL CHEST PAIN: ICD-10-CM

## 2018-05-28 DIAGNOSIS — R06.02 SHORTNESS OF BREATH: ICD-10-CM

## 2018-05-28 LAB
DIASTOLIC DYSFUNCTION: NO
DIASTOLIC DYSFUNCTION: NO
ESTIMATED PA SYSTOLIC PRESSURE: 26.81
RETIRED EF AND QEF - SEE NOTES: 55 (ref 55–65)

## 2018-05-28 PROCEDURE — 93015 CV STRESS TEST SUPVJ I&R: CPT | Mod: S$GLB,,, | Performed by: NUCLEAR MEDICINE

## 2018-05-28 PROCEDURE — 93306 TTE W/DOPPLER COMPLETE: CPT | Mod: S$GLB,,, | Performed by: NUCLEAR MEDICINE

## 2018-05-28 PROCEDURE — A9502 TC99M TETROFOSMIN: HCPCS | Mod: PO

## 2018-05-28 PROCEDURE — 78452 HT MUSCLE IMAGE SPECT MULT: CPT | Mod: 26,,, | Performed by: NUCLEAR MEDICINE

## 2018-05-28 NOTE — TELEPHONE ENCOUNTER
Please call pt  She passed her nuclear stress test.  No blockages noted.  Echo shows normal heart strength/function.    F/u prn    Dr Pond

## 2018-05-29 ENCOUNTER — OFFICE VISIT (OUTPATIENT)
Dept: PULMONOLOGY | Facility: CLINIC | Age: 53
End: 2018-05-29
Payer: COMMERCIAL

## 2018-05-29 ENCOUNTER — TELEPHONE (OUTPATIENT)
Dept: CARDIOLOGY | Facility: CLINIC | Age: 53
End: 2018-05-29

## 2018-05-29 ENCOUNTER — LAB VISIT (OUTPATIENT)
Dept: LAB | Facility: HOSPITAL | Age: 53
End: 2018-05-29
Attending: INTERNAL MEDICINE
Payer: COMMERCIAL

## 2018-05-29 VITALS
OXYGEN SATURATION: 97 % | DIASTOLIC BLOOD PRESSURE: 64 MMHG | BODY MASS INDEX: 26 KG/M2 | RESPIRATION RATE: 19 BRPM | WEIGHT: 152.31 LBS | HEART RATE: 69 BPM | SYSTOLIC BLOOD PRESSURE: 110 MMHG | HEIGHT: 64 IN

## 2018-05-29 DIAGNOSIS — R06.02 SOB (SHORTNESS OF BREATH): ICD-10-CM

## 2018-05-29 DIAGNOSIS — Z87.891 HX OF SMOKING: ICD-10-CM

## 2018-05-29 DIAGNOSIS — R06.02 SOB (SHORTNESS OF BREATH): Primary | ICD-10-CM

## 2018-05-29 DIAGNOSIS — J43.9 LUNG BLEBS: ICD-10-CM

## 2018-05-29 DIAGNOSIS — J43.9 LUNG BULLAE: ICD-10-CM

## 2018-05-29 LAB — D DIMER PPP IA.FEU-MCNC: 0.2 MG/L FEU

## 2018-05-29 PROCEDURE — 36415 COLL VENOUS BLD VENIPUNCTURE: CPT | Mod: PO

## 2018-05-29 PROCEDURE — 99204 OFFICE O/P NEW MOD 45 MIN: CPT | Mod: S$GLB,,, | Performed by: INTERNAL MEDICINE

## 2018-05-29 PROCEDURE — 99999 PR PBB SHADOW E&M-EST. PATIENT-LVL III: CPT | Mod: PBBFAC,,, | Performed by: INTERNAL MEDICINE

## 2018-05-29 PROCEDURE — 3008F BODY MASS INDEX DOCD: CPT | Mod: CPTII,S$GLB,, | Performed by: INTERNAL MEDICINE

## 2018-05-29 PROCEDURE — 85379 FIBRIN DEGRADATION QUANT: CPT

## 2018-05-29 RX ORDER — ALBUTEROL SULFATE 90 UG/1
2 AEROSOL, METERED RESPIRATORY (INHALATION) EVERY 6 HOURS PRN
Qty: 18 G | Refills: 5 | Status: SHIPPED | OUTPATIENT
Start: 2018-05-29 | End: 2023-05-26

## 2018-05-29 NOTE — TELEPHONE ENCOUNTER
I have attempted without success to contact this patient by phone left message for pt to call back.

## 2018-05-29 NOTE — TELEPHONE ENCOUNTER
----- Message from Jazmin Lopez sent at 5/29/2018  9:01 AM CDT -----  Pt at [phone]//states she is returning your call//please call again//thanks/vidal

## 2018-05-29 NOTE — PROGRESS NOTES
Initial Outpatient Pulmonary Evaluation       SUBJECTIVE:     History of Present Illness:  Patient is a 52 y.o. female presenting for evaluation of shortness of breath that happened 3 weeks ago.  Patient said that she did have shortness of breath overnight about 3 weeks ago, it was not associated with chest pain, denies coughing denies sputum production and denies wheezing.  She does have a twenty-seven pack year smoking history quit smoking in 2003.  She does state that she is having some stressful issues at work.  She does state that she worked at [employer] and she had exposure to asbestos for 3 days.  She has a history of breast cancer with bilateral mastectomy 8 years ago.  No chemotherapy or radiation received.      Chief Complaint   Patient presents with    Shortness of Breath       Review of patient's allergies indicates:   Allergen Reactions    Iodine and iodide containing products     Minocycline Swelling    Tetracyclines     Bactrim [sulfamethoxazole-trimethoprim] Rash     B/P drops, fainting       Current Outpatient Prescriptions   Medication Sig Dispense Refill    diazePAM (VALIUM) 2 MG tablet Take 2 mg by mouth every 6 (six) hours as needed.      LORazepam (ATIVAN) 0.5 MG tablet Take 0.5 mg by mouth every 6 (six) hours as needed for Anxiety.      MULTIVITAMIN (MULTIPLE VITAMIN ORAL) Take by mouth once daily.      albuterol 90 mcg/actuation inhaler Inhale 2 puffs into the lungs every 6 (six) hours as needed for Wheezing. Rescue 18 g 5    EPINEPHrine (EPIPEN 2-THOM) 0.3 mg/0.3 mL AtIn as needed.       No current facility-administered medications for this visit.        Past Medical History:   Diagnosis Date    Allergy     Arthritis     Blood transfusion     Breast cancer      Past Surgical History:   Procedure Laterality Date    BREAST BIOPSY      c-sections  2003    CHOLECYSTECTOMY      COLONOSCOPY      MASTECTOMY       Family History  "  Problem Relation Age of Onset    Colon cancer Father      Social History   Substance Use Topics    Smoking status: Former Smoker     Packs/day: 1.00     Years: 27.00     Quit date: 2003    Smokeless tobacco: Never Used    Alcohol use No        Review of Systems:  Review of Systems   Constitutional: Negative for chills, fatigue and fever.   HENT: Negative for nosebleeds and voice change.    Eyes: Negative for redness.   Respiratory: Positive for shortness of breath. Negative for cough and wheezing.    Cardiovascular: Negative for chest pain and leg swelling.   Gastrointestinal: Negative for abdominal pain.   Endocrine: Negative for polyphagia.   Genitourinary: Negative for hematuria.   Musculoskeletal: Negative for gait problem.   Skin: Negative for rash and wound.   Allergic/Immunologic: Negative for immunocompromised state.   Neurological: Negative for seizures, speech difficulty and numbness.   Hematological: Negative for adenopathy.        History of breath cancer status post bilateral mastectomy   Psychiatric/Behavioral: Negative for behavioral problems. The patient is nervous/anxious.        OBJECTIVE:     Vital Signs (Most Recent)  Pulse: 69 (05/29/18 0751)  Resp: 19 (05/29/18 0751)  BP: 110/64 (05/29/18 0751)  SpO2: 97 % (05/29/18 0751)  5' 4" (1.626 m)  69.1 kg (152 lb 5.4 oz)     Physical Exam:  Physical Exam   Constitutional: She is oriented to person, place, and time. She appears well-developed and well-nourished.   HENT:   Head: Atraumatic.   Eyes: EOM are normal.   Neck: Neck supple.   Cardiovascular: Normal rate and regular rhythm.    Pulmonary/Chest: Effort normal and breath sounds normal.   Abdominal: Soft. There is no tenderness.   Musculoskeletal: She exhibits no edema or deformity.   Neurological: She is alert and oriented to person, place, and time.   Skin: Skin is warm.   Psychiatric: She has a normal mood and affect.       Laboratory    Lab Results   Component Value Date    WBC 4.21 " 04/20/2018    HGB 12.8 04/20/2018    HCT 38.2 04/20/2018    MCV 93 04/20/2018     04/20/2018     BMP  Lab Results   Component Value Date     04/20/2018    K 3.9 04/20/2018     04/20/2018    CO2 28 04/20/2018    BUN 11 04/20/2018    CREATININE 0.7 04/20/2018    CALCIUM 9.5 04/20/2018    ANIONGAP 8 04/20/2018    ESTGFRAFRICA >60 04/20/2018    EGFRNONAA >60 04/20/2018     BNP  @LABRCNTIP(BNP,BNPTRIAGEBLO)@  Lab Results   Component Value Date    TSH 1.354 04/20/2018     ABG  @LABRCNTIP(PH,PO2,PCO2,HCO3,BE)@    Diagnostic Results:    CT chest personally reviewed.  Left upper lobe blebs, bulla noted.    Bibasilar areas of subsegmental atelectasis are noted.  No bronchiectasis.  No pleural effusion or pneumothorax.  There is a bullae present within the lateral aspect of the left upper lung which measures up to 3.2 cm.  Small blebs are seen in the peripheral right upper lung.  No pulmonary mass is identified.  Mild scarring at the lung apices.  ASSESSMENT/PLAN:     SOB (shortness of breath)  -     D-DIMER, QUANTITATIVE; Future; Expected date: 05/29/2018  -     albuterol 90 mcg/actuation inhaler; Inhale 2 puffs into the lungs every 6 (six) hours as needed for Wheezing. Rescue  Dispense: 18 g; Refill: 5    Lung blebs  -     Complete PFT with bronchodilator; Future    Lung bullae  -     Complete PFT with bronchodilator; Future    Hx of smoking  -     Complete PFT with bronchodilator; Future      We will treat the patient empirically with albuterol as needed for shortness of breath, check D-dimer.    PFT to assess patient lung function.    If the above workup is normal, patient's shortness of breath might most likely be stress related.    Follow-up in about 6 weeks (around 7/10/2018).    This note was prepared using voice recognition system and is likely to have sound alike errors that may have been overlooked even after proof reading.  Please call me with any questions    Discussed diagnosis, its evaluation,  treatment and usual course. All questions answered.    Thank you for the courtesy of participating in the care of this patient    Kurtis Park MD

## 2018-05-29 NOTE — LETTER
May 29, 2018      Ancelmo Batres MD  [address] [facility] [address] [facility]  [place] [address]           [facility] - [facility]  [address] [place] [address]  Phone: [phone]  Fax: [phone]          Patient: Kaelyn Rosales   MR Number: [id number]   YOB: 1965   Date of Visit: 5/29/2018       Dear Dr. Ancelmo Batres:    Thank you for referring Kaelyn Rosales to me for evaluation. Attached you will find relevant portions of my assessment and plan of care.    If you have questions, please do not hesitate to call me. I look forward to following Kaelyn Rosales along with you.    Sincerely,    Kurtis Park MD    Enclosure  CC:  No Recipients    If you would like to receive this communication electronically, please contact [email or web address] or [phone] to request more information on [employer] Link access.    For providers and/or their staff who would like to refer a patient to [facility], please contact us through our one-stop-shop provider referral line, [facility], at [phone].    If you feel you have received this communication in error or would no longer like to receive these types of communications, please e-mail [email or web address]

## 2018-06-29 ENCOUNTER — TELEPHONE (OUTPATIENT)
Dept: PULMONOLOGY | Facility: CLINIC | Age: 53
End: 2018-06-29

## 2020-01-13 ENCOUNTER — TELEPHONE (OUTPATIENT)
Dept: HEMATOLOGY/ONCOLOGY | Facility: CLINIC | Age: 55
End: 2020-01-13

## 2020-01-13 NOTE — TELEPHONE ENCOUNTER
Spoke to patient informed will sent message to provider to place orders for labs, verbalized understanding.

## 2020-01-13 NOTE — TELEPHONE ENCOUNTER
----- Message from Hillary Polk sent at 1/13/2020  8:55 AM CST -----  Contact: self [phone]  States that she need orders for labs for her appt scheduled on 01/21/20. Please call back at [phone]//thank you acc

## 2020-01-15 DIAGNOSIS — Z85.3 HISTORY OF RIGHT BREAST CANCER: Primary | ICD-10-CM

## 2020-01-17 ENCOUNTER — LAB VISIT (OUTPATIENT)
Dept: LAB | Facility: HOSPITAL | Age: 55
End: 2020-01-17
Attending: INTERNAL MEDICINE
Payer: COMMERCIAL

## 2020-01-17 DIAGNOSIS — Z85.3 HISTORY OF RIGHT BREAST CANCER: ICD-10-CM

## 2020-01-17 LAB
ALBUMIN SERPL BCP-MCNC: 4 G/DL (ref 3.5–5.2)
ALP SERPL-CCNC: 77 U/L (ref 55–135)
ALT SERPL W/O P-5'-P-CCNC: 25 U/L (ref 10–44)
ANION GAP SERPL CALC-SCNC: 10 MMOL/L (ref 8–16)
AST SERPL-CCNC: 29 U/L (ref 10–40)
BASOPHILS # BLD AUTO: 0.04 K/UL (ref 0–0.2)
BASOPHILS NFR BLD: 0.9 % (ref 0–1.9)
BILIRUB SERPL-MCNC: 0.6 MG/DL (ref 0.1–1)
BUN SERPL-MCNC: 12 MG/DL (ref 6–20)
CALCIUM SERPL-MCNC: 9.5 MG/DL (ref 8.7–10.5)
CHLORIDE SERPL-SCNC: 102 MMOL/L (ref 95–110)
CO2 SERPL-SCNC: 29 MMOL/L (ref 23–29)
CREAT SERPL-MCNC: 0.7 MG/DL (ref 0.5–1.4)
DIFFERENTIAL METHOD: ABNORMAL
EOSINOPHIL # BLD AUTO: 0.3 K/UL (ref 0–0.5)
EOSINOPHIL NFR BLD: 5.6 % (ref 0–8)
ERYTHROCYTE [DISTWIDTH] IN BLOOD BY AUTOMATED COUNT: 12.7 % (ref 11.5–14.5)
EST. GFR  (AFRICAN AMERICAN): >60 ML/MIN/1.73 M^2
EST. GFR  (NON AFRICAN AMERICAN): >60 ML/MIN/1.73 M^2
GLUCOSE SERPL-MCNC: 86 MG/DL (ref 70–110)
HCT VFR BLD AUTO: 37.2 % (ref 37–48.5)
HGB BLD-MCNC: 12.2 G/DL (ref 12–16)
IMM GRANULOCYTES # BLD AUTO: 0.01 K/UL (ref 0–0.04)
IMM GRANULOCYTES NFR BLD AUTO: 0.2 % (ref 0–0.5)
LYMPHOCYTES # BLD AUTO: 1.8 K/UL (ref 1–4.8)
LYMPHOCYTES NFR BLD: 40.4 % (ref 18–48)
MCH RBC QN AUTO: 30.9 PG (ref 27–31)
MCHC RBC AUTO-ENTMCNC: 32.8 G/DL (ref 32–36)
MCV RBC AUTO: 94 FL (ref 82–98)
MONOCYTES # BLD AUTO: 0.3 K/UL (ref 0.3–1)
MONOCYTES NFR BLD: 6.7 % (ref 4–15)
NEUTROPHILS # BLD AUTO: 2.1 K/UL (ref 1.8–7.7)
NEUTROPHILS NFR BLD: 46.4 % (ref 38–73)
NRBC BLD-RTO: 0 /100 WBC
PLATELET # BLD AUTO: 205 K/UL (ref 150–350)
PMV BLD AUTO: 11.2 FL (ref 9.2–12.9)
POTASSIUM SERPL-SCNC: 3.8 MMOL/L (ref 3.5–5.1)
PROT SERPL-MCNC: 7 G/DL (ref 6–8.4)
RBC # BLD AUTO: 3.95 M/UL (ref 4–5.4)
SODIUM SERPL-SCNC: 141 MMOL/L (ref 136–145)
WBC # BLD AUTO: 4.45 K/UL (ref 3.9–12.7)

## 2020-01-17 PROCEDURE — 80053 COMPREHEN METABOLIC PANEL: CPT

## 2020-01-17 PROCEDURE — 36415 COLL VENOUS BLD VENIPUNCTURE: CPT

## 2020-01-17 PROCEDURE — 85025 COMPLETE CBC W/AUTO DIFF WBC: CPT

## 2020-01-21 ENCOUNTER — OFFICE VISIT (OUTPATIENT)
Dept: HEMATOLOGY/ONCOLOGY | Facility: CLINIC | Age: 55
End: 2020-01-21
Payer: COMMERCIAL

## 2020-01-21 VITALS
WEIGHT: 163.56 LBS | DIASTOLIC BLOOD PRESSURE: 69 MMHG | HEIGHT: 64 IN | SYSTOLIC BLOOD PRESSURE: 118 MMHG | HEART RATE: 77 BPM | BODY MASS INDEX: 27.92 KG/M2 | OXYGEN SATURATION: 99 % | TEMPERATURE: 98 F

## 2020-01-21 DIAGNOSIS — Z85.3 HISTORY OF RIGHT BREAST CANCER: Primary | ICD-10-CM

## 2020-01-21 PROCEDURE — 99214 PR OFFICE/OUTPT VISIT, EST, LEVL IV, 30-39 MIN: ICD-10-PCS | Mod: S$GLB,,, | Performed by: INTERNAL MEDICINE

## 2020-01-21 PROCEDURE — 99214 OFFICE O/P EST MOD 30 MIN: CPT | Mod: S$GLB,,, | Performed by: INTERNAL MEDICINE

## 2020-01-21 PROCEDURE — 3008F BODY MASS INDEX DOCD: CPT | Mod: CPTII,S$GLB,, | Performed by: INTERNAL MEDICINE

## 2020-01-21 PROCEDURE — 99999 PR PBB SHADOW E&M-EST. PATIENT-LVL III: ICD-10-PCS | Mod: PBBFAC,,, | Performed by: INTERNAL MEDICINE

## 2020-01-21 PROCEDURE — 3008F PR BODY MASS INDEX (BMI) DOCUMENTED: ICD-10-PCS | Mod: CPTII,S$GLB,, | Performed by: INTERNAL MEDICINE

## 2020-01-21 PROCEDURE — 99999 PR PBB SHADOW E&M-EST. PATIENT-LVL III: CPT | Mod: PBBFAC,,, | Performed by: INTERNAL MEDICINE

## 2020-01-21 RX ORDER — TRAMADOL HYDROCHLORIDE 50 MG/1
50 TABLET ORAL EVERY 6 HOURS
COMMUNITY

## 2020-01-21 NOTE — PROGRESS NOTES
Subjective:       Patient ID: Kaelyn Rosales is a 54 y.o. female.    Chief Complaint: No chief complaint on file.    HPI This is a 54 year-old  lady who comes for follow up pf her previously diagnosed rbeast cancer    She saw   me for a second opinion in 04/2009. The patient informed me that she had   been having nipple pain on the right breast for several months. Because of  the persistence of the pain, she was evaluated and found to have an   abnormality in the breast on repeat mammograms. One done four months prior  to that had been negative. She had a biopsy that showed invasive   carcinoma. She elected to have surgery in [place] and had decided to   have a bilateral mastectomy. She had a modified radical mastectomy with   right and a simple mastectomy on the left on 03/03/2009. The pathology   indicated that she had a tubular adenocarcinoma of the right breast with   negative lymph node. It measured 0.6 cm. It was ER/OR positive.   HER-2/Karla was 1+. This corresponds to sample [id number] from the   [facility] [place], [place], done on 03/03/2009.   The patient had a cholecystectomy in December of 2007. Some liver nodules   were seen on preop CT, but at the time of the surgery the nodules looked   benign and they were not biopsied. CT scans done in [place], [place], in   01/27/2009, where she had gone on opinion before deciding on surgery had   been reported as showing increase in the size of the liver nodules when   compared to a CT of 12/2007.     Imaging studies done at our institution were finally interpreted by the   radiologist as showing cysts in the liver as opposed to solid masses. She   was asked to consider taking tamoxifen in the adjuvant setting. She took   it for a few weeks but started feeling very poorly. She has been followed   off any medications.   She says shehas recurrent bouts of pancreatitis which is being addressed by her gastroenterologist..last  attack was in 2018. She hs reduced her alcohol intake although she still has a drink very now and then.        She does not have any more mammograms since she had both breast removed with subsequent implants      SOCIAL HISTORY: She is  with one child. She lives in [place].    She quit smoking around . She smoked 1 to 1-1/2 packs a day for  10 years.   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     FAMILY HISTORY: No diabetes or heart attacks. Uncle had cancer of the   prostate. Father had colon cancer at 79.     PREVIOUS SURGERIES: Bilateral mastectomy followed by reconstruction.   Myomectomy of the uterus in place. Cholecystectomy,  and   laparoscopy.   Right thumb surgery    PAST MEDICAL HISTORY:   1. History of breast cancer in the right s/p mastectomy and s/p simple prophylactic mastectomy on the left.  2. Status post bilateral reconstruction of breast.   3. Arthralgias.   4. Hepatic cysts   5- fatty liver  6-Recurrent ancreatitis  7-diverticulosis by colonoscopy  Review of Systems   Constitutional: Negative.    HENT: Negative.    Eyes: Negative.    Respiratory: Negative.  Negative for cough and wheezing.    Cardiovascular: Negative.  Negative for chest pain.   Gastrointestinal: Negative.    Genitourinary: Negative.    Neurological: Negative.    Psychiatric/Behavioral: Negative.        Objective:      Physical Exam   Constitutional: She is oriented to person, place, and time. She appears well-developed. No distress.   HENT:   Head: Normocephalic.   Right Ear: Tympanic membrane, external ear and ear canal normal.   Left Ear: Tympanic membrane, external ear and ear canal normal.   Nose: Nose normal. Right sinus exhibits no maxillary sinus tenderness and no frontal sinus tenderness. Left sinus exhibits no maxillary sinus tenderness and no frontal sinus tenderness.   Mouth/Throat: Oropharynx is clear and moist and mucous membranes are normal.   Teeth normal.  Gums  normal.   Eyes: Pupils are equal, round, and reactive to light. Conjunctivae and lids are normal.   Neck: Normal carotid pulses, no hepatojugular reflux and no JVD present. Carotid bruit is not present. No tracheal deviation present. No thyroid mass and no thyromegaly present.   Cardiovascular: Normal rate, regular rhythm, S1 normal, S2 normal, normal heart sounds and intact distal pulses. Exam reveals no gallop and no friction rub.   No murmur heard.  Carotid exam normal   Pulmonary/Chest: Effort normal and breath sounds normal. No accessory muscle usage. No respiratory distress. She has no wheezes. She has no rales. She exhibits no tenderness.   Breast implants bilaterally. No  masses   Abdominal: Soft. Normal appearance. She exhibits no distension and no mass. There is no splenomegaly or hepatomegaly. There is no tenderness. There is no rebound and no guarding.   Musculoskeletal: Normal range of motion. She exhibits no edema or tenderness.        Right hand: Normal.        Left hand: Normal.   Nails normal   Lymphadenopathy:     She has no cervical adenopathy.   Neurological: She is alert and oriented to person, place, and time. No cranial nerve deficit. Coordination normal.   Skin: Skin is warm and dry. No rash noted. She is not diaphoretic. No erythema. No pallor.   Psychiatric: She has a normal mood and affect. Her behavior is normal. Judgment and thought content normal.       Wt Readings from Last 3 Encounters:   01/21/20 74.2 kg (163 lb 9.3 oz)   05/29/18 69.1 kg (152 lb 5.4 oz)   05/22/18 69.5 kg (153 lb 3.2 oz)     Temp Readings from Last 3 Encounters:   01/21/20 98 °F (36.7 °C) (Oral)   05/22/18 98.1 °F (36.7 °C) (Oral)   05/21/18 98.1 °F (36.7 °C) (Oral)     BP Readings from Last 3 Encounters:   01/21/20 118/69   05/29/18 110/64   05/22/18 102/72     Pulse Readings from Last 3 Encounters:   01/21/20 77   05/29/18 69   05/22/18 88       Assessment:       1. History of right breast cancer    2. Recurrent  pancreatitis        Plan:       Lab Results   Component Value Date    WBC 4.45 01/17/2020    HGB 12.2 01/17/2020    HCT 37.2 01/17/2020    MCV 94 01/17/2020     01/17/2020       Lab Results   Component Value Date    CREATININE 0.7 01/17/2020     Lab Results   Component Value Date    ALT 25 01/17/2020    AST 29 01/17/2020    ALKPHOS 77 01/17/2020    BILITOT 0.6 01/17/2020     Needs to stop alcohol completely.  She seems stable from the breast cancer point of view. Return in a year with a cbc/cmp

## 2020-02-15 ENCOUNTER — HOSPITAL ENCOUNTER (OUTPATIENT)
Dept: RADIOLOGY | Facility: CLINIC | Age: 55
Discharge: HOME | End: 2020-02-15
Attending: NURSE PRACTITIONER
Payer: COMMERCIAL

## 2020-02-15 ENCOUNTER — OFFICE VISIT (OUTPATIENT)
Dept: URGENT CARE | Facility: CLINIC | Age: 55
End: 2020-02-15
Payer: COMMERCIAL

## 2020-02-15 VITALS
SYSTOLIC BLOOD PRESSURE: 128 MMHG | OXYGEN SATURATION: 100 % | HEIGHT: 64 IN | WEIGHT: 163 LBS | DIASTOLIC BLOOD PRESSURE: 66 MMHG | BODY MASS INDEX: 27.83 KG/M2 | RESPIRATION RATE: 18 BRPM | TEMPERATURE: 98 F | HEART RATE: 66 BPM

## 2020-02-15 DIAGNOSIS — R07.81 RIB PAIN: ICD-10-CM

## 2020-02-15 DIAGNOSIS — R07.81 RIB PAIN: Primary | ICD-10-CM

## 2020-02-15 PROCEDURE — 71100 X-RAY EXAM RIBS UNI 2 VIEWS: CPT | Mod: RT,S$GLB,, | Performed by: RADIOLOGY

## 2020-02-15 PROCEDURE — 71100 XR RIBS 2 VIEW RIGHT: ICD-10-PCS | Mod: RT,S$GLB,, | Performed by: RADIOLOGY

## 2020-02-15 PROCEDURE — 99214 PR OFFICE/OUTPT VISIT, EST, LEVL IV, 30-39 MIN: ICD-10-PCS | Mod: S$GLB,,, | Performed by: NURSE PRACTITIONER

## 2020-02-15 PROCEDURE — 99214 OFFICE O/P EST MOD 30 MIN: CPT | Mod: S$GLB,,, | Performed by: NURSE PRACTITIONER

## 2020-02-15 NOTE — PATIENT INSTRUCTIONS
Treating Warts     You and your healthcare provider can discuss whether your warts need to be treated.     You and your healthcare provider can talk about what treatment may be best for your wart or warts. To get rid of your warts, your healthcare provider may need to try more than one type of treatment. The methods described below are often used to treat warts.  Types of treatment  · Do nothing. Most warts will resolve within 2 years, even without treatment. So doing nothing is sometimes a good option. This is particularly true for smaller warts that are not causing symptoms.  · Cryotherapy (liquid nitrogen). This kills skin cells by freezing them. It kills the warts and destroys skin infected by the wart-causing virus. This is done in your healthcare providers office and will cause some discomfort. It may take several treatments over several weeks to get rid of the warts.  · Topical medicines. Prescribed topical medicines can be put on the skin. These are usually applied in the healthcare provider's office. But some prescriptions may be applied at home.  · Over-the-counter (OTC) topical treatments. OTC medicines that most often contain salicylic acid may be an option. These patches, liquids, and creams are used at home. The medicine is applied daily to the wart and nearby skin. It's usually left on overnight. The dead skin is filed down the next day. In 1 to 3 days, the procedure can be repeated. Topical treatments are sometimes combined with cryotherapy.  · Electrodessication and curettage (ED & C).  For this procedure, the healthcare provider applies numbing medicine to the wart. Then the wart is scraped or cut off. This type of treatment is usually not the first line of therapy.  · Laser surgery.  This can vaporize wart tissue or destroy the blood vessels that feed the wart. This is done in the healthcare provider's office.  · Shots (injections). These can be used to treat warts that dont respond to other  treatments, such as stubborn or painful warts around the nails. This is done in the healthcare providers office.  When to seek medical treatment  Its a good idea to have your healthcare provider check your warts. That way your provider can rule out any other skin problems. Sometimes a callous or a corn can look like a wart, but the treatments may differ. Treatment can also provide relief from warts that bleed, burn, hurt, or itch. Genital warts should always be treated. They can spread to other people through sexual contact. And they may cause genital or cervical cancer.  Getting good results  After having your warts treated, new warts may still appear. Dont be discouraged. Warts often come back. See your healthcare provider again to discuss this. Your provider can tell you about the treatments that most likely will help clear your skin of warts.   Date Last Reviewed: 2/1/2017  © 8503-7950 [employer]. [address] [place] [address]. All rights reserved. This information is not intended as a substitute for professional medical care. Always follow your healthcare professional's instructions.        Rib Contusion     A rib contusion is a bruise to one or more rib bones. It may cause pain, tenderness, swelling and a purplish discoloration. There may be a sharp pain while breathing.  You will be assessed for other injuries. You will likely be given pain medicine. Rib contusions heal on their own, without further treatment. However, pain may take weeks to months to go away.   Note that a small crack (fracture) in the rib may cause the same symptoms as a rib contusion. The small crack may not be seen on a chest X-ray. However, the conditions are managed in the same way.  Home care  · Rest. Avoid heavy lifting, strenuous exertion, or any activity that causes pain.  · Ice the area to reduce pain and swelling. Put ice cubes in a plastic bag or use a cold pack. (Wrap the cold source in a thin  towel. Do not place it directly on your skin.) Ice the injured area for 20 minutes every 1 to 2 hours the first day. Continue with ice packs 3 to 4 times a day for the next 2 days, then as needed for the relief of pain and swelling.  · Take any prescribed pain medicine as directed by your healthcare provider. If none was prescribed, take acetaminophen, ibuprofen, or naproxen to control pain.  · If you have a significant injury, you may be given a device called an incentive spirometer to keep your lungs healthy. Use as directed.  Follow-up care  Follow up with your healthcare provider during the next week or as directed.  When to seek medical advice  Call your healthcare provider for any of the following:  · Shortness of breath or trouble breathing  · Increasing chest pain with breathing  · Coughing  · Dizziness, weakness, or fainting  · New or worsening pain  · Fever of 100.4°F (38ºC) or higher, or as directed by your healthcare provider  Date Last Reviewed: 2/1/2017  © 2752-6870 [employer]. [address] [place] [address], [place] [address]. All rights reserved. This information is not intended as a substitute for professional medical care. Always follow your healthcare professional's instructions.

## 2020-02-15 NOTE — PROGRESS NOTES
"Subjective:       Patient ID: Kaelyn Rosales is a 54 y.o. female.    Vitals:  height is 5' 4" (1.626 m) and weight is 73.9 kg (163 lb). Her temperature is 97.8 °F (36.6 °C). Her blood pressure is 128/66 and her pulse is 66. Her respiration is 18 and oxygen saturation is 100%.     Chief Complaint: Right Rib Pain    Pt presents to clinic with anterior lower rib pain. Pt was seen by her chiropractor four weeks ago, she believes when they adjusted her back they might have broken her rib. Pt states it hurts when she takes a deep breath and when she lifts up on something.    Chest Pain    This is a new problem. The current episode started 1 to 4 weeks ago. The pain is at a severity of 7/10. The quality of the pain is described as sharp. The pain does not radiate. Pertinent negatives include no cough, dizziness, fever, headaches, nausea, shortness of breath, vomiting or weakness. The pain is aggravated by deep breathing.       Constitution: Negative for chills, fatigue and fever.   HENT: Negative for congestion and sore throat.    Neck: Negative for painful lymph nodes.   Cardiovascular: Negative for chest pain and leg swelling.   Eyes: Negative for double vision and blurred vision.   Respiratory: Negative for cough and shortness of breath.    Gastrointestinal: Negative for nausea, vomiting and diarrhea.   Genitourinary: Negative for dysuria, frequency, urgency and history of kidney stones.   Musculoskeletal: Positive for pain and trauma. Negative for joint pain, joint swelling, muscle cramps and muscle ache.   Skin: Negative for color change, pale, rash and bruising.   Allergic/Immunologic: Negative for seasonal allergies.   Neurological: Negative for dizziness, history of vertigo, light-headedness, passing out and headaches.   Hematologic/Lymphatic: Negative for swollen lymph nodes.   Psychiatric/Behavioral: Negative for nervous/anxious, sleep disturbance and depression. The patient is not nervous/anxious.      "   Objective:      Physical Exam   Constitutional: She is oriented to person, place, and time. She appears well-developed and well-nourished. She is cooperative.  Non-toxic appearance. She does not appear ill. No distress.   HENT:   Head: Normocephalic and atraumatic.   Right Ear: Hearing, tympanic membrane, external ear and ear canal normal.   Left Ear: Hearing, tympanic membrane, external ear and ear canal normal.   Nose: Nose normal. No mucosal edema, rhinorrhea or nasal deformity. No epistaxis. Right sinus exhibits no maxillary sinus tenderness and no frontal sinus tenderness. Left sinus exhibits no maxillary sinus tenderness and no frontal sinus tenderness.   Mouth/Throat: Uvula is midline, oropharynx is clear and moist and mucous membranes are normal. No trismus in the jaw. Normal dentition. No uvula swelling. No posterior oropharyngeal erythema.   Eyes: Conjunctivae and lids are normal. Right eye exhibits no discharge. Left eye exhibits no discharge. No scleral icterus.   Neck: Trachea normal, normal range of motion, full passive range of motion without pain and phonation normal. Neck supple.   Cardiovascular: Normal rate, regular rhythm, normal heart sounds, intact distal pulses and normal pulses.   Pulmonary/Chest: Effort normal and breath sounds normal. No accessory muscle usage. No tachypnea and no bradypnea. No respiratory distress. She exhibits bony tenderness (right side under right breast rib region).   Abdominal: Soft. Normal appearance and bowel sounds are normal. She exhibits no distension, no pulsatile midline mass and no mass. There is no tenderness.   Musculoskeletal: Normal range of motion. She exhibits no edema or deformity.   Neurological: She is alert and oriented to person, place, and time. She exhibits normal muscle tone. Coordination normal.   Skin: Skin is warm, dry, intact, not diaphoretic and not pale.   Psychiatric: She has a normal mood and affect. Her speech is normal and behavior is  normal. Judgment and thought content normal. Cognition and memory are normal.   Nursing note and vitals reviewed.        Assessment:       1. Rib pain        Plan:         Rib pain  -     X-Ray Ribs 2 View Right; Future; Expected date: 02/15/2020          X-ray Ribs 2 View Right    Result Date: 2/15/2020  EXAMINATION: XR RIBS 2 VIEW RIGHT CLINICAL HISTORY: Pleurodynia TECHNIQUE: Two views of the right ribs were performed. COMPARISON: 05/21/2018 FINDINGS: Cardiac silhouette and mediastinum stable.  Lungs demonstrate no acute opacity.  No pleural effusion.  No definite rib abnormality appreciated.  Osseous structures unchanged.     As above Electronically signed by: Rex Turner MD Date:    02/15/2020 Time:    15:40    Follow prescribed treatment plan as directed.  Apply warm compresses as advised  OTC Tylenol or Motrin as advised  Stay hydrated and rest.  Report to ER if symptoms worsen.  Follow up with PCP in 2-3 days or sooner if symptoms do not improve.

## 2020-02-18 ENCOUNTER — TELEPHONE (OUTPATIENT)
Dept: URGENT CARE | Facility: CLINIC | Age: 55
End: 2020-02-18

## 2020-09-02 ENCOUNTER — OFFICE VISIT (OUTPATIENT)
Dept: UROLOGY | Facility: CLINIC | Age: 55
End: 2020-09-02
Payer: COMMERCIAL

## 2020-09-02 VITALS
SYSTOLIC BLOOD PRESSURE: 124 MMHG | BODY MASS INDEX: 29.4 KG/M2 | WEIGHT: 171.31 LBS | DIASTOLIC BLOOD PRESSURE: 78 MMHG | TEMPERATURE: 99 F

## 2020-09-02 DIAGNOSIS — N39.41 URGE INCONTINENCE: Primary | ICD-10-CM

## 2020-09-02 DIAGNOSIS — R31.29 MICROSCOPIC HEMATURIA: ICD-10-CM

## 2020-09-02 LAB
BILIRUB SERPL-MCNC: ABNORMAL MG/DL
BILIRUB UR QL STRIP: NEGATIVE
BLOOD URINE, POC: ABNORMAL
CLARITY UR: CLEAR
CLARITY, POC UA: CLEAR
COLOR UR: YELLOW
COLOR, POC UA: YELLOW
GLUCOSE UR QL STRIP: ABNORMAL
GLUCOSE UR QL STRIP: NEGATIVE
HGB UR QL STRIP: ABNORMAL
KETONES UR QL STRIP: ABNORMAL
KETONES UR QL STRIP: NEGATIVE
LEUKOCYTE ESTERASE UR QL STRIP: NEGATIVE
LEUKOCYTE ESTERASE URINE, POC: ABNORMAL
NITRITE UR QL STRIP: NEGATIVE
NITRITE, POC UA: ABNORMAL
PH UR STRIP: 6 [PH] (ref 5–8)
PH, POC UA: 5
PROT UR QL STRIP: NEGATIVE
PROTEIN, POC: ABNORMAL
SP GR UR STRIP: 1.02 (ref 1–1.03)
SPECIFIC GRAVITY, POC UA: 1.02
URN SPEC COLLECT METH UR: ABNORMAL
UROBILINOGEN, POC UA: ABNORMAL

## 2020-09-02 PROCEDURE — 99999 PR PBB SHADOW E&M-EST. PATIENT-LVL III: CPT | Mod: PBBFAC,,, | Performed by: UROLOGY

## 2020-09-02 PROCEDURE — 3008F PR BODY MASS INDEX (BMI) DOCUMENTED: ICD-10-PCS | Mod: CPTII,S$GLB,, | Performed by: UROLOGY

## 2020-09-02 PROCEDURE — 81002 POCT URINE DIPSTICK WITHOUT MICROSCOPE: ICD-10-PCS | Mod: S$GLB,,, | Performed by: UROLOGY

## 2020-09-02 PROCEDURE — 99203 PR OFFICE/OUTPT VISIT, NEW, LEVL III, 30-44 MIN: ICD-10-PCS | Mod: 25,S$GLB,, | Performed by: UROLOGY

## 2020-09-02 PROCEDURE — 3008F BODY MASS INDEX DOCD: CPT | Mod: CPTII,S$GLB,, | Performed by: UROLOGY

## 2020-09-02 PROCEDURE — 99203 OFFICE O/P NEW LOW 30 MIN: CPT | Mod: 25,S$GLB,, | Performed by: UROLOGY

## 2020-09-02 PROCEDURE — 81003 URINALYSIS AUTO W/O SCOPE: CPT

## 2020-09-02 PROCEDURE — 81002 URINALYSIS NONAUTO W/O SCOPE: CPT | Mod: S$GLB,,, | Performed by: UROLOGY

## 2020-09-02 PROCEDURE — 99999 PR PBB SHADOW E&M-EST. PATIENT-LVL III: ICD-10-PCS | Mod: PBBFAC,,, | Performed by: UROLOGY

## 2020-09-02 PROCEDURE — 87086 URINE CULTURE/COLONY COUNT: CPT

## 2020-09-02 RX ORDER — SOLIFENACIN SUCCINATE 10 MG/1
10 TABLET, FILM COATED ORAL DAILY
Qty: 30 TABLET | Refills: 11 | Status: SHIPPED | OUTPATIENT
Start: 2020-09-02 | End: 2023-05-26

## 2020-09-02 NOTE — PROGRESS NOTES
Chief Complaint:   Encounter Diagnoses   Name Primary?    Urge incontinence Yes    Microscopic hematuria        HPI:  Good 4-year-old female who comes in with significant incontinence.  Patient states this has been going on for some time, appears to be getting worse.  Significant urinary frequency and urgency.  She is getting up 4 times per evening to void, changes are pad twice during this time secondary to odor.  She voids about every hour and a half during the daytime goes through 2 pads.  No significant stress incontinence, this all appears to be urge incontinence.  No gross hematuria, trace microscopic hematuria, she does have history of smoking.  She has had no real dysuria up to this point, although she does have a remote history of UTIs, none recently.  No other urological gynecological history, except for possible ovarian cyst.  She has altered female organs, no pelvic surgeries, she does not have any evidence constipation.  No family history of urological cancers or stones, she has not tried any medications for the urgency as of yet, but she is interested.    Allergies:  Iodine and iodide containing products, Minocycline, Tetracyclines, and Bactrim [sulfamethoxazole-trimethoprim]    Medications:  has a current medication list which includes the following prescription(s): epinephrine, multivitamin, albuterol, diazepam, lorazepam, and tramadol.    Review of Systems:  General: No fever, chills, fatigability, or weight loss.  Skin: No rashes, itching, or changes in color or texture of skin.  Chest: Denies HAYDEN, cyanosis, wheezing, cough, and sputum production.  Abdomen: Appetite fine. No weight loss. Denies diarrhea, abdominal pain, hematemesis, or blood in stool.  Musculoskeletal: No joint stiffness or swelling. Denies back pain.  : As above.  All other review of systems negative.    PMH:   has a past medical history of Allergy, Arthritis, Blood transfusion, and Breast cancer.    PSH:   has a past surgical  history that includes Mastectomy; Cholecystectomy; Colonoscopy; Breast biopsy; and c-sections (2003).    FamHx: family history includes Colon cancer in her father.    SocHx:  reports that she quit smoking about 17 years ago. She has a 27.00 pack-year smoking history. She has never used smokeless tobacco. She reports that she does not drink alcohol or use drugs.      Physical Exam:  Vitals:    09/02/20 1338   BP: 124/78   Temp: 98.8 °F (37.1 °C)     General: A&Ox3, no apparent distress, no deformities  Neck: No masses, normal ROM  Lungs: normal inspiration, no use of accessory muscles  Heart: normal pulse, no arrhythmias  Abdomen: Soft, NT, ND, no masses, no hernias, no hepatosplenomegaly  Skin: The skin is warm and dry. No jaundice.  Ext: No c/c/e.    Labs/Studies:   Urinalysis trace blood 9/20    Impression/Plan:     1.  Urge incontinence- go ahead and attempt medical therapy with VESIcare 10 mg.  She knows to monitor for dry mouth, dry eyes, constipation, and possible urinary retention.  She will contact us with any complaints.  We will see her back in 1 month with a cystoscopy to rule out any pathological abnormalities.    2.  Microscopic hematuria- see her back for cystoscopy, perform microscopic evaluation today to confirm whether this is true microscopic hematuria.  If abnormal possible upper tract imaging will be warranted.  Patient has noted no gross hematuria, but she does have a history of smoking.

## 2020-09-03 LAB — BACTERIA UR CULT: NO GROWTH

## 2020-10-22 ENCOUNTER — OFFICE VISIT (OUTPATIENT)
Dept: UROLOGY | Facility: CLINIC | Age: 55
End: 2020-10-22
Payer: COMMERCIAL

## 2020-10-22 VITALS
WEIGHT: 166.44 LBS | TEMPERATURE: 98 F | DIASTOLIC BLOOD PRESSURE: 90 MMHG | SYSTOLIC BLOOD PRESSURE: 144 MMHG | BODY MASS INDEX: 28.57 KG/M2

## 2020-10-22 DIAGNOSIS — N39.41 URGE INCONTINENCE: Primary | ICD-10-CM

## 2020-10-22 DIAGNOSIS — R31.29 MICROSCOPIC HEMATURIA: ICD-10-CM

## 2020-10-22 LAB
BILIRUB SERPL-MCNC: NORMAL MG/DL
BLOOD URINE, POC: NORMAL
CLARITY, POC UA: CLEAR
COLOR, POC UA: YELLOW
GLUCOSE UR QL STRIP: NORMAL
KETONES UR QL STRIP: NORMAL
LEUKOCYTE ESTERASE URINE, POC: NORMAL
NITRITE, POC UA: NORMAL
PH, POC UA: 7
PROTEIN, POC: NORMAL
SPECIFIC GRAVITY, POC UA: 1.01
UROBILINOGEN, POC UA: NORMAL

## 2020-10-22 PROCEDURE — 52000 CYSTOURETHROSCOPY: CPT | Mod: S$GLB,,, | Performed by: UROLOGY

## 2020-10-22 PROCEDURE — 96372 THER/PROPH/DIAG INJ SC/IM: CPT | Mod: 59,S$GLB,, | Performed by: UROLOGY

## 2020-10-22 PROCEDURE — 99499 UNLISTED E&M SERVICE: CPT | Mod: S$GLB,,, | Performed by: UROLOGY

## 2020-10-22 PROCEDURE — 99999 PR PBB SHADOW E&M-EST. PATIENT-LVL III: ICD-10-PCS | Mod: PBBFAC,,, | Performed by: UROLOGY

## 2020-10-22 PROCEDURE — 52000 PR CYSTOURETHROSCOPY: ICD-10-PCS | Mod: S$GLB,,, | Performed by: UROLOGY

## 2020-10-22 PROCEDURE — 99999 PR PBB SHADOW E&M-EST. PATIENT-LVL III: CPT | Mod: PBBFAC,,, | Performed by: UROLOGY

## 2020-10-22 PROCEDURE — 81002 POCT URINE DIPSTICK WITHOUT MICROSCOPE: ICD-10-PCS | Mod: S$GLB,,, | Performed by: UROLOGY

## 2020-10-22 PROCEDURE — 81002 URINALYSIS NONAUTO W/O SCOPE: CPT | Mod: S$GLB,,, | Performed by: UROLOGY

## 2020-10-22 PROCEDURE — 96372 PR INJECTION,THERAP/PROPH/DIAG2ST, IM OR SUBCUT: ICD-10-PCS | Mod: 59,S$GLB,, | Performed by: UROLOGY

## 2020-10-22 PROCEDURE — 99499 NO LOS: ICD-10-PCS | Mod: S$GLB,,, | Performed by: UROLOGY

## 2020-10-22 RX ORDER — CEFTRIAXONE 1 G/1
1 INJECTION, POWDER, FOR SOLUTION INTRAMUSCULAR; INTRAVENOUS
Status: COMPLETED | OUTPATIENT
Start: 2020-10-22 | End: 2020-10-22

## 2020-10-22 RX ADMIN — CEFTRIAXONE 1 G: 1 INJECTION, POWDER, FOR SOLUTION INTRAMUSCULAR; INTRAVENOUS at 03:10

## 2020-10-22 NOTE — PROGRESS NOTES
At 1512:  Patient in today for Cystoscopy, Rocephin Injection ordered after procedure. Site cleaned with alcohol wipe, 1 Gm Rocephin adm IM to left ventrogluteal, band aid applied.  Pt tolerated injection well.  Pt was asked to remain in room for 15 minutes for observation then left clinic ambulatory.

## 2020-10-22 NOTE — PROGRESS NOTES
Chief Complaint:   Encounter Diagnoses   Name Primary?    Urge incontinence Yes    Microscopic hematuria        HPI:    10/22/20- patient is here for cystoscopy.  Vesicare is working well on an every other day dosing.  55 year-old female who comes in with significant incontinence.  Patient states this has been going on for some time, appears to be getting worse.  Significant urinary frequency and urgency.  She is getting up 4 times per evening to void, changes are pad twice during this time secondary to odor.  She voids about every hour and a half during the daytime goes through 2 pads.  No significant stress incontinence, this all appears to be urge incontinence.  No gross hematuria, trace microscopic hematuria, she does have history of smoking.  She has had no real dysuria up to this point, although she does have a remote history of UTIs, none recently.  No other urological gynecological history, except for possible ovarian cyst.  She has altered female organs, no pelvic surgeries, she does not have any evidence constipation.  No family history of urological cancers or stones, she has not tried any medications for the urgency as of yet, but she is interested.    Allergies:  Iodine and iodide containing products, Minocycline, Tetracyclines, and Bactrim [sulfamethoxazole-trimethoprim]    Medications:  has a current medication list which includes the following prescription(s): lorazepam, solifenacin, albuterol, diazepam, epinephrine, multivitamin, and tramadol.    Review of Systems:  General: No fever, chills, fatigability, or weight loss.  Skin: No rashes, itching, or changes in color or texture of skin.  Chest: Denies HAYDEN, cyanosis, wheezing, cough, and sputum production.  Abdomen: Appetite fine. No weight loss. Denies diarrhea, abdominal pain, hematemesis, or blood in stool.  Musculoskeletal: No joint stiffness or swelling. Denies back pain.  : As above.  All other review of systems negative.    PMH:   has a  past medical history of Allergy, Arthritis, Blood transfusion, and Breast cancer.    PSH:   has a past surgical history that includes Mastectomy; Cholecystectomy; Colonoscopy; Breast biopsy; and c-sections (2003).    FamHx: family history includes Colon cancer in her father.    SocHx:  reports that she quit smoking about 17 years ago. She has a 27.00 pack-year smoking history. She has never used smokeless tobacco. She reports that she does not drink alcohol or use drugs.      Physical Exam:  There were no vitals filed for this visit.  General: A&Ox3, no apparent distress, no deformities  Neck: No masses, normal ROM  Lungs: normal inspiration, no use of accessory muscles  Heart: normal pulse, no arrhythmias  Abdomen: Soft, NT, ND, no masses, no hernias, no hepatosplenomegaly  Skin: The skin is warm and dry. No jaundice.  Ext: No c/c/e.  - 10/20- normal urethral meatus, slightly displaced within the anterior wall of the vagina, no perineal abnl, no vaginal prolapse.    Labs/Studies:   Urinalysis normal 10/20  Cystoscopy negative 10/20    Procedure: Diagnostic Cystoscopy    Procedure in Detail: After proper consents were obtained, the patient was prepped and draped in normal sterile fashion for diagnostic cystoscopy. The flexible cystoscope was then introduced into the urethra, and advanced into the bladder under direct vision. The urethral mucosa appeared normal, and no strictures were noted. The sphincter appeared to be normal.  The bladder neck was normal. Inspection of the interior of the bladder was then carried out. The trigone was unremarkable, with no mucosal lesions. The ureteral orifices were normal in position and configuration. Systematic inspection of the mucosa of the bladder was then carried out, rotating the cystoscope so that all areas of the left and right lateral walls, the dome of the bladder, and the posterior wall were all visualized. The cystoscope was then advanced further into the bladder,  and maximum deflection of the scope was performed so that the bladder neck could be inspected. No mucosal lesions were noted there. The cystoscope was then removed, and the procedure terminated.     Findings: normal cystoscopy      Impression/Plan:     1.  Urge incontinence- patient is doing well with every other day vesicare and will continue, see me back in 6 months and if clear then yearly afterwards.  Call with any issues in the meantime.    2.  Microscopic hematuria- cystoscopy negative.  Upper tract imaging will be warranted in the future if recurrent, but no evidence today.  Patient has noted no gross hematuria, but she does have a history of smoking.

## 2020-11-10 ENCOUNTER — TELEPHONE (OUTPATIENT)
Dept: UROLOGY | Facility: CLINIC | Age: 55
End: 2020-11-10

## 2020-11-10 NOTE — TELEPHONE ENCOUNTER
----- Message from Saravanan Quan sent at 11/10/2020 10:49 AM CST -----  Contact: Pt  Type:  Needs Medical Advice    Who Called: pt   Symptoms (please be specific): poss bladder infection/ backache, burning when attempting to urinate  How long has patient had these symptoms:   severe sympt - yesterday  Pharmacy [facility] and phone #:  [facility] [place]   Would the patient rather a call back or a response via [facility]? Phone   Best Call Back Number:[phone]  Additional Information:       [id number] [place] [facility] [id number] [facility] [address]  Phone: [phone] Fax: [phone]

## 2021-01-15 ENCOUNTER — LAB VISIT (OUTPATIENT)
Dept: LAB | Facility: HOSPITAL | Age: 56
End: 2021-01-15
Attending: NURSE PRACTITIONER
Payer: COMMERCIAL

## 2021-01-15 ENCOUNTER — OFFICE VISIT (OUTPATIENT)
Dept: HEMATOLOGY/ONCOLOGY | Facility: CLINIC | Age: 56
End: 2021-01-15
Payer: COMMERCIAL

## 2021-01-15 VITALS
BODY MASS INDEX: 29.4 KG/M2 | HEIGHT: 64 IN | OXYGEN SATURATION: 98 % | WEIGHT: 172.19 LBS | SYSTOLIC BLOOD PRESSURE: 130 MMHG | TEMPERATURE: 98 F | DIASTOLIC BLOOD PRESSURE: 78 MMHG | HEART RATE: 65 BPM

## 2021-01-15 DIAGNOSIS — F41.9 ANXIETY: Primary | ICD-10-CM

## 2021-01-15 DIAGNOSIS — Z85.3 HISTORY OF RIGHT BREAST CANCER: ICD-10-CM

## 2021-01-15 LAB
ALBUMIN SERPL BCP-MCNC: 3.9 G/DL (ref 3.5–5.2)
ALP SERPL-CCNC: 76 U/L (ref 55–135)
ALT SERPL W/O P-5'-P-CCNC: 13 U/L (ref 10–44)
ANION GAP SERPL CALC-SCNC: 6 MMOL/L (ref 8–16)
AST SERPL-CCNC: 23 U/L (ref 10–40)
BASOPHILS # BLD AUTO: 0.04 K/UL (ref 0–0.2)
BASOPHILS NFR BLD: 1 % (ref 0–1.9)
BILIRUB SERPL-MCNC: 0.4 MG/DL (ref 0.1–1)
BUN SERPL-MCNC: 14 MG/DL (ref 6–20)
CALCIUM SERPL-MCNC: 8.9 MG/DL (ref 8.7–10.5)
CHLORIDE SERPL-SCNC: 105 MMOL/L (ref 95–110)
CO2 SERPL-SCNC: 29 MMOL/L (ref 23–29)
CREAT SERPL-MCNC: 0.7 MG/DL (ref 0.5–1.4)
DIFFERENTIAL METHOD: ABNORMAL
EOSINOPHIL # BLD AUTO: 0.2 K/UL (ref 0–0.5)
EOSINOPHIL NFR BLD: 5.9 % (ref 0–8)
ERYTHROCYTE [DISTWIDTH] IN BLOOD BY AUTOMATED COUNT: 13.2 % (ref 11.5–14.5)
EST. GFR  (AFRICAN AMERICAN): >60 ML/MIN/1.73 M^2
EST. GFR  (NON AFRICAN AMERICAN): >60 ML/MIN/1.73 M^2
GLUCOSE SERPL-MCNC: 92 MG/DL (ref 70–110)
HCT VFR BLD AUTO: 39 % (ref 37–48.5)
HGB BLD-MCNC: 12.7 G/DL (ref 12–16)
IMM GRANULOCYTES # BLD AUTO: 0 K/UL (ref 0–0.04)
IMM GRANULOCYTES NFR BLD AUTO: 0 % (ref 0–0.5)
LYMPHOCYTES # BLD AUTO: 1.9 K/UL (ref 1–4.8)
LYMPHOCYTES NFR BLD: 46.8 % (ref 18–48)
MCH RBC QN AUTO: 31.8 PG (ref 27–31)
MCHC RBC AUTO-ENTMCNC: 32.6 G/DL (ref 32–36)
MCV RBC AUTO: 98 FL (ref 82–98)
MONOCYTES # BLD AUTO: 0.4 K/UL (ref 0.3–1)
MONOCYTES NFR BLD: 8.8 % (ref 4–15)
NEUTROPHILS # BLD AUTO: 1.5 K/UL (ref 1.8–7.7)
NEUTROPHILS NFR BLD: 37.5 % (ref 38–73)
NRBC BLD-RTO: 0 /100 WBC
PLATELET # BLD AUTO: 218 K/UL (ref 150–350)
PMV BLD AUTO: 10.8 FL (ref 9.2–12.9)
POTASSIUM SERPL-SCNC: 4.3 MMOL/L (ref 3.5–5.1)
PROT SERPL-MCNC: 6.5 G/DL (ref 6–8.4)
RBC # BLD AUTO: 3.99 M/UL (ref 4–5.4)
SODIUM SERPL-SCNC: 140 MMOL/L (ref 136–145)
WBC # BLD AUTO: 4.08 K/UL (ref 3.9–12.7)

## 2021-01-15 PROCEDURE — 85025 COMPLETE CBC W/AUTO DIFF WBC: CPT

## 2021-01-15 PROCEDURE — 1126F PR PAIN SEVERITY QUANTIFIED, NO PAIN PRESENT: ICD-10-PCS | Mod: S$GLB,,, | Performed by: INTERNAL MEDICINE

## 2021-01-15 PROCEDURE — 1126F AMNT PAIN NOTED NONE PRSNT: CPT | Mod: S$GLB,,, | Performed by: INTERNAL MEDICINE

## 2021-01-15 PROCEDURE — 3008F BODY MASS INDEX DOCD: CPT | Mod: CPTII,S$GLB,, | Performed by: INTERNAL MEDICINE

## 2021-01-15 PROCEDURE — 99214 PR OFFICE/OUTPT VISIT, EST, LEVL IV, 30-39 MIN: ICD-10-PCS | Mod: S$GLB,,, | Performed by: INTERNAL MEDICINE

## 2021-01-15 PROCEDURE — 99999 PR PBB SHADOW E&M-EST. PATIENT-LVL III: ICD-10-PCS | Mod: PBBFAC,,, | Performed by: INTERNAL MEDICINE

## 2021-01-15 PROCEDURE — 80053 COMPREHEN METABOLIC PANEL: CPT

## 2021-01-15 PROCEDURE — 99999 PR PBB SHADOW E&M-EST. PATIENT-LVL III: CPT | Mod: PBBFAC,,, | Performed by: INTERNAL MEDICINE

## 2021-01-15 PROCEDURE — 3008F PR BODY MASS INDEX (BMI) DOCUMENTED: ICD-10-PCS | Mod: CPTII,S$GLB,, | Performed by: INTERNAL MEDICINE

## 2021-01-15 PROCEDURE — 99214 OFFICE O/P EST MOD 30 MIN: CPT | Mod: S$GLB,,, | Performed by: INTERNAL MEDICINE

## 2021-01-15 PROCEDURE — 36415 COLL VENOUS BLD VENIPUNCTURE: CPT

## 2021-01-15 RX ORDER — LORAZEPAM 0.5 MG/1
0.5 TABLET ORAL EVERY 6 HOURS PRN
Qty: 30 TABLET | Refills: 0 | Status: SHIPPED | OUTPATIENT
Start: 2021-01-15 | End: 2023-05-26

## 2023-04-03 PROBLEM — K85.90 ACUTE PANCREATITIS WITHOUT NECROSIS OR INFECTION, UNSPECIFIED: Status: ACTIVE | Noted: 2018-04-20
